# Patient Record
Sex: FEMALE | Race: WHITE | NOT HISPANIC OR LATINO
[De-identification: names, ages, dates, MRNs, and addresses within clinical notes are randomized per-mention and may not be internally consistent; named-entity substitution may affect disease eponyms.]

---

## 2017-11-02 ENCOUNTER — APPOINTMENT (OUTPATIENT)
Dept: BREAST CENTER | Facility: CLINIC | Age: 72
End: 2017-11-02
Payer: MEDICARE

## 2017-11-02 VITALS
DIASTOLIC BLOOD PRESSURE: 90 MMHG | HEIGHT: 62 IN | SYSTOLIC BLOOD PRESSURE: 138 MMHG | BODY MASS INDEX: 21.53 KG/M2 | WEIGHT: 117 LBS | HEART RATE: 88 BPM

## 2017-11-02 PROCEDURE — 99214 OFFICE O/P EST MOD 30 MIN: CPT

## 2019-01-07 ENCOUNTER — APPOINTMENT (OUTPATIENT)
Dept: BREAST CENTER | Facility: CLINIC | Age: 74
End: 2019-01-07
Payer: MEDICARE

## 2019-01-07 VITALS — WEIGHT: 116 LBS | HEIGHT: 62 IN | BODY MASS INDEX: 21.35 KG/M2

## 2019-01-07 VITALS — SYSTOLIC BLOOD PRESSURE: 145 MMHG | DIASTOLIC BLOOD PRESSURE: 84 MMHG | HEART RATE: 87 BPM

## 2019-01-07 PROCEDURE — 99214 OFFICE O/P EST MOD 30 MIN: CPT

## 2019-01-11 ENCOUNTER — OTHER (OUTPATIENT)
Age: 74
End: 2019-01-11

## 2019-05-06 ENCOUNTER — OUTPATIENT (OUTPATIENT)
Dept: OUTPATIENT SERVICES | Facility: HOSPITAL | Age: 74
LOS: 1 days | Discharge: HOME | End: 2019-05-06

## 2019-05-06 VITALS
OXYGEN SATURATION: 99 % | HEIGHT: 62 IN | HEART RATE: 78 BPM | DIASTOLIC BLOOD PRESSURE: 76 MMHG | RESPIRATION RATE: 16 BRPM | WEIGHT: 117.95 LBS | TEMPERATURE: 98 F | SYSTOLIC BLOOD PRESSURE: 188 MMHG

## 2019-05-06 VITALS
RESPIRATION RATE: 17 BRPM | HEART RATE: 55 BPM | TEMPERATURE: 98 F | SYSTOLIC BLOOD PRESSURE: 167 MMHG | OXYGEN SATURATION: 100 % | DIASTOLIC BLOOD PRESSURE: 75 MMHG

## 2019-05-06 DIAGNOSIS — Z98.890 OTHER SPECIFIED POSTPROCEDURAL STATES: Chronic | ICD-10-CM

## 2019-05-06 RX ORDER — OFLOXACIN 0.3 %
1 DROPS OPHTHALMIC (EYE)
Qty: 0 | Refills: 0 | Status: DISCONTINUED | OUTPATIENT
Start: 2019-05-06 | End: 2019-05-21

## 2019-05-06 RX ORDER — CYCLOPENTOLATE HYDROCHLORIDE 10 MG/ML
1 SOLUTION/ DROPS OPHTHALMIC
Qty: 0 | Refills: 0 | Status: COMPLETED | OUTPATIENT
Start: 2019-05-06 | End: 2019-05-06

## 2019-05-06 RX ORDER — PHENYLEPHRINE HCL 2.5 %
1 DROPS OPHTHALMIC (EYE)
Qty: 0 | Refills: 0 | Status: COMPLETED | OUTPATIENT
Start: 2019-05-06 | End: 2019-05-06

## 2019-05-06 RX ORDER — ACETAMINOPHEN 500 MG
650 TABLET ORAL EVERY 6 HOURS
Qty: 0 | Refills: 0 | Status: DISCONTINUED | OUTPATIENT
Start: 2019-05-06 | End: 2019-05-21

## 2019-05-06 RX ORDER — TROPICAMIDE 1 %
1 DROPS OPHTHALMIC (EYE)
Qty: 0 | Refills: 0 | Status: COMPLETED | OUTPATIENT
Start: 2019-05-06 | End: 2019-05-06

## 2019-05-06 RX ORDER — SODIUM CHLORIDE 9 MG/ML
1000 INJECTION, SOLUTION INTRAVENOUS
Qty: 0 | Refills: 0 | Status: DISCONTINUED | OUTPATIENT
Start: 2019-05-06 | End: 2019-05-06

## 2019-05-06 RX ADMIN — Medication 1 DROP(S): at 13:26

## 2019-05-06 RX ADMIN — Medication 1 DROP(S): at 13:07

## 2019-05-06 RX ADMIN — Medication 1 DROP(S): at 13:06

## 2019-05-06 RX ADMIN — CYCLOPENTOLATE HYDROCHLORIDE 1 DROP(S): 10 SOLUTION/ DROPS OPHTHALMIC at 13:25

## 2019-05-06 RX ADMIN — CYCLOPENTOLATE HYDROCHLORIDE 1 DROP(S): 10 SOLUTION/ DROPS OPHTHALMIC at 13:07

## 2019-05-06 RX ADMIN — Medication 1 DROP(S): at 13:15

## 2019-05-06 RX ADMIN — Medication 1 DROP(S): at 13:16

## 2019-05-06 RX ADMIN — CYCLOPENTOLATE HYDROCHLORIDE 1 DROP(S): 10 SOLUTION/ DROPS OPHTHALMIC at 13:15

## 2019-05-06 RX ADMIN — Medication 1 DROP(S): at 13:25

## 2019-05-06 NOTE — ASU PATIENT PROFILE, ADULT - PMH
Breast cancer    Hyperlipidemia, unspecified hyperlipidemia type    Hypertension, unspecified type    Hypothyroidism, unspecified type

## 2019-05-06 NOTE — ASU DISCHARGE PLAN (ADULT/PEDIATRIC) - NURSING INSTRUCTIONS
For any problems or concerns,contact your doctor. [facility] patients should call the [facility]. If you cannot reach the doctor or clinic, call [facility] Emergency Department at [phone] or go to your local Emergency Department.  A responsible adult should be with you for the rest of the day and night for your safety and to help you if you needed. Resume your medications as listed on the attached Medication Record. Begin with liquids and light food ( tea, toast, Jello, soups). Advance to what you normally eat. Liquids should taken in adequate amounts today.     CALL the DOCTOR:    -Fever greater than  101F  - Signs  of infection such as : increase pain,swelling,redness,or a bad  smell coming from the wound.  -Excessive amount of bleeding.  - Any pain that appears to be getting worse.  - Vomiting  -  If you have  not urinated 8 hours after surgery or have any difficulty urinating.     A responsible adult should be with you for the rest of the day and night for your safety and to help you if you needed.    Review attached FACT SHEET if applicable.

## 2019-05-09 DIAGNOSIS — H26.9 UNSPECIFIED CATARACT: ICD-10-CM

## 2019-05-09 DIAGNOSIS — H25.12 AGE-RELATED NUCLEAR CATARACT, LEFT EYE: ICD-10-CM

## 2019-05-09 DIAGNOSIS — Z88.8 ALLERGY STATUS TO OTHER DRUGS, MEDICAMENTS AND BIOLOGICAL SUBSTANCES STATUS: ICD-10-CM

## 2019-05-09 DIAGNOSIS — I10 ESSENTIAL (PRIMARY) HYPERTENSION: ICD-10-CM

## 2019-05-09 DIAGNOSIS — Z72.0 TOBACCO USE: ICD-10-CM

## 2019-11-11 PROBLEM — E78.5 HYPERLIPIDEMIA, UNSPECIFIED: Chronic | Status: ACTIVE | Noted: 2019-05-06

## 2019-11-11 PROBLEM — E03.9 HYPOTHYROIDISM, UNSPECIFIED: Chronic | Status: ACTIVE | Noted: 2019-05-06

## 2019-11-14 ENCOUNTER — APPOINTMENT (OUTPATIENT)
Dept: BREAST CENTER | Facility: CLINIC | Age: 74
End: 2019-11-14
Payer: MEDICARE

## 2019-11-14 VITALS
DIASTOLIC BLOOD PRESSURE: 89 MMHG | WEIGHT: 118 LBS | BODY MASS INDEX: 21.71 KG/M2 | HEART RATE: 88 BPM | HEIGHT: 62 IN | SYSTOLIC BLOOD PRESSURE: 173 MMHG

## 2019-11-14 DIAGNOSIS — N64.4 MASTODYNIA: ICD-10-CM

## 2019-11-14 DIAGNOSIS — Z85.3 PERSONAL HISTORY OF MALIGNANT NEOPLASM OF BREAST: ICD-10-CM

## 2019-11-14 PROCEDURE — 99214 OFFICE O/P EST MOD 30 MIN: CPT

## 2019-11-14 NOTE — HISTORY OF PRESENT ILLNESS
[FreeTextEntry1] : 74 year old female with a history of bilateral metachronous breast cancer presents for a surveillance examination.   She started having mild discomfort in her left breast near her lumpectomy scar last week.\par The symptoms are subsiding.

## 2019-11-14 NOTE — PROCEDURE
[FreeTextEntry3] : Targeted right breast ultrasound   Findings: 9 x 9 mm poorly marginated hypoechoic mass at 7:00 N6.

## 2019-11-14 NOTE — DATA REVIEWED
[No studies available for review at this time.] : No studies available for review at this time. [FreeTextEntry1] : Mammogram/sonogram:   1/11/19        Findings:  Negative\par

## 2019-11-14 NOTE — PHYSICAL EXAM
[Normocephalic] : normocephalic [Sclera nonicteric] : sclera nonicteric [Supple] : supple [Conjunctiva pink] : conjunctiva pink [No Supraclavicular Adenopathy] : no supraclavicular adenopathy [Clear to Auscultation Bilat] : clear to auscultation bilaterally [No Thyromegaly] : no thyromegaly [No Cervical Adenopathy] : no cervical adenopathy [No Gallops] : no gallops [Normal Sinus Rhythm] : normal sinus rhythm [No Rubs] : no pericardial rub [Symmetrical] : symmetrical [Examined in the supine and seated position] : examined in the supine and seated position [Grade 2] : Ptosis Grade 2 [No dominant masses] : no dominant masses in right breast  [No Nipple Retraction] : no right nipple retraction [No dominant masses] : no dominant masses left breast [No Nipple Discharge] : no left nipple discharge [No Axillary Lymphadenopathy] : no left axillary lymphadenopathy [Soft] : abdomen soft [No Palpable Masses] : no abdominal mass palpated [No Hepato-Splenomegaly] : no hepato-splenomegaly [No Edema] : no edema [No Rashes] : no rashes [Full ROM] : full range of motion [de-identified] : Lumpectomy scar at 12:00 N3 [de-identified] : Lumpectomy scar 3:00 periareolar region. Mild tenderness on palpation at the 3-4:00 periareolar region. No clinically palpable findings.

## 2020-07-29 ENCOUNTER — APPOINTMENT (OUTPATIENT)
Dept: BREAST CENTER | Facility: CLINIC | Age: 75
End: 2020-07-29
Payer: MEDICARE

## 2020-07-29 VITALS
WEIGHT: 112 LBS | HEART RATE: 89 BPM | HEIGHT: 62 IN | SYSTOLIC BLOOD PRESSURE: 182 MMHG | BODY MASS INDEX: 20.61 KG/M2 | DIASTOLIC BLOOD PRESSURE: 81 MMHG

## 2020-07-29 PROCEDURE — 19083 BX BREAST 1ST LESION US IMAG: CPT

## 2020-07-29 PROCEDURE — 99214 OFFICE O/P EST MOD 30 MIN: CPT

## 2020-08-04 ENCOUNTER — APPOINTMENT (OUTPATIENT)
Dept: BREAST CENTER | Facility: CLINIC | Age: 75
End: 2020-08-04
Payer: MEDICARE

## 2020-08-04 ENCOUNTER — LABORATORY RESULT (OUTPATIENT)
Age: 75
End: 2020-08-04

## 2020-08-04 VITALS
WEIGHT: 110 LBS | SYSTOLIC BLOOD PRESSURE: 174 MMHG | HEIGHT: 62 IN | DIASTOLIC BLOOD PRESSURE: 92 MMHG | HEART RATE: 84 BPM | BODY MASS INDEX: 20.24 KG/M2

## 2020-08-04 DIAGNOSIS — Z87.898 PERSONAL HISTORY OF OTHER SPECIFIED CONDITIONS: ICD-10-CM

## 2020-08-04 DIAGNOSIS — N63.10 UNSPECIFIED LUMP IN THE RIGHT BREAST, UNSPECIFIED QUADRANT: ICD-10-CM

## 2020-08-04 PROCEDURE — 19083 BX BREAST 1ST LESION US IMAG: CPT

## 2020-08-04 NOTE — REASON FOR VISIT
[Procedure: _________] : a [unfilled] procedure visit [FreeTextEntry1] : Ultrasound guided core biopsy right breast

## 2020-08-04 NOTE — PHYSICAL EXAM
[Conjunctiva pink] : conjunctiva pink [Normocephalic] : normocephalic [Sclera nonicteric] : sclera nonicteric [Supple] : supple [No Supraclavicular Adenopathy] : no supraclavicular adenopathy [No Cervical Adenopathy] : no cervical adenopathy [Clear to Auscultation Bilat] : clear to auscultation bilaterally [No Thyromegaly] : no thyromegaly [Normal Sinus Rhythm] : normal sinus rhythm [No Gallops] : no gallops [No Rubs] : no pericardial rub [Examined in the supine and seated position] : examined in the supine and seated position [Grade 2] : Ptosis Grade 2 [Symmetrical] : symmetrical [No dominant masses] : no dominant masses in right breast  [No dominant masses] : no dominant masses left breast [No Nipple Discharge] : no left nipple discharge [No Nipple Retraction] : no left nipple retraction [No Axillary Lymphadenopathy] : no left axillary lymphadenopathy [No Palpable Masses] : no abdominal mass palpated [Soft] : abdomen soft [No Hepato-Splenomegaly] : no hepato-splenomegaly [No Rashes] : no rashes [Full ROM] : full range of motion [No Edema] : no edema [de-identified] : Lumpectomy scar at 12:00 N3.  Prominent thickening below scar.   [de-identified] : Lumpectomy scar 3:00 periareolar region.

## 2020-08-04 NOTE — HISTORY OF PRESENT ILLNESS
[FreeTextEntry1] : 74 year old female with a history of bilateral metachronous breast cancer thinks that the scar tissue at her right lumpectomy site is more prominent.  She has no associated symptoms.

## 2020-08-04 NOTE — DATA REVIEWED
[No studies available for review at this time.] : No studies available for review at this time. [FreeTextEntry1] : Mammogram:   2/14/20     Findings:  Negative\par \par Breast Ultrasound:  2/14/20   Findings:  Stable 1 cm solid mass right breast at 7:00 N6. ( previously biopsied)\par

## 2020-08-07 PROBLEM — Z87.898 HISTORY OF LUMP OF RIGHT BREAST: Status: RESOLVED | Noted: 2020-07-29 | Resolved: 2020-08-07

## 2020-08-07 PROBLEM — N63.10 BREAST MASS, RIGHT: Status: ACTIVE | Noted: 2020-08-07

## 2020-08-11 ENCOUNTER — APPOINTMENT (OUTPATIENT)
Dept: BREAST CENTER | Facility: CLINIC | Age: 75
End: 2020-08-11
Payer: MEDICARE

## 2020-08-11 PROCEDURE — 99215 OFFICE O/P EST HI 40 MIN: CPT

## 2020-08-11 NOTE — CONSULT LETTER
[Please see my note below.] : Please see my note below. [Dear  ___] : Dear ~JCARLOS, [Courtesy Letter:] : I had the pleasure of seeing your patient, [unfilled], in my office today. [Sincerely,] : Sincerely, [FreeTextEntry2] : Damon Jerry MD [FreeTextEntry3] : Xiao Washington MD FACS\par

## 2020-08-11 NOTE — HISTORY OF PRESENT ILLNESS
[FreeTextEntry1] : 74 year old female presented 2 weeks ago with a palpable right breast mass at/near a prior lumpectomy site.  Core biopsy was performed in the office and the pathology revealed an ER +, HER2 negative invasive ductal carcinoma.  She is here for treatment planning.\par \par Breast cancer history:     2000 - Invasive Ductal Carcinoma LEFT breast.  ER positive.\par                                                     Lumpectomy/axillary node dissection.\par                                                     Adjuvant chemotherapy/Radiation \par                                                    Tamoxifen x 3 years.\par \par                                         2003-  Invasive Ductal Carcinoma RIGHT breast.  ER negative.\par                                                    Lumpectomy/sentinel node biopsy\par                                                    Chemotherapy/Radiation.\par

## 2020-08-11 NOTE — DATA REVIEWED
[FreeTextEntry1] : Bilateral Mammogram:  2/14/20    Findings: Extremely dense tissue.  No suspicious findings. Post surgically scar right breast at 12:00 N1 and left breast at 2:00 N1. Stable nodule right breast at 9:00 N6, previously benign on core biopsy. \par \par Right Diagnostic Mammogram: 7/31/20          Findings: Architectural distortion upper central right breast at site of palpable lump.\par \par Right Breast ultrasound:  18 x 18 mm ill defined, hypoechoic mass at 12:00 N1.  Not seen on prior ultrasound of 02/14/20.  \par \par IN office US core biopsy Right breast (12:00 N2-3) : 8/4/20  PATHOLOGY: Infiltrating Ductal Carcinoma ( 6/9).  ER - 50%,positive;  DE - 1%, negative. HER2 - negative.

## 2020-08-14 ENCOUNTER — OUTPATIENT (OUTPATIENT)
Dept: OUTPATIENT SERVICES | Facility: HOSPITAL | Age: 75
LOS: 1 days | End: 2020-08-14
Payer: MEDICARE

## 2020-08-14 ENCOUNTER — APPOINTMENT (OUTPATIENT)
Dept: NUCLEAR MEDICINE | Facility: CLINIC | Age: 75
End: 2020-08-14
Payer: MEDICARE

## 2020-08-14 DIAGNOSIS — C50.911 MALIGNANT NEOPLASM OF UNSPECIFIED SITE OF RIGHT FEMALE BREAST: ICD-10-CM

## 2020-08-14 DIAGNOSIS — Z98.890 OTHER SPECIFIED POSTPROCEDURAL STATES: Chronic | ICD-10-CM

## 2020-08-14 PROCEDURE — 78815 PET IMAGE W/CT SKULL-THIGH: CPT

## 2020-08-14 PROCEDURE — 78815 PET IMAGE W/CT SKULL-THIGH: CPT | Mod: 26,PI

## 2020-08-14 PROCEDURE — A9552: CPT

## 2020-08-19 ENCOUNTER — RESULT REVIEW (OUTPATIENT)
Age: 75
End: 2020-08-19

## 2020-08-19 ENCOUNTER — OUTPATIENT (OUTPATIENT)
Dept: OUTPATIENT SERVICES | Facility: HOSPITAL | Age: 75
LOS: 1 days | End: 2020-08-19
Payer: MEDICARE

## 2020-08-19 ENCOUNTER — APPOINTMENT (OUTPATIENT)
Dept: MRI IMAGING | Facility: CLINIC | Age: 75
End: 2020-08-19
Payer: MEDICARE

## 2020-08-19 DIAGNOSIS — C50.911 MALIGNANT NEOPLASM OF UNSPECIFIED SITE OF RIGHT FEMALE BREAST: ICD-10-CM

## 2020-08-19 DIAGNOSIS — Z98.890 OTHER SPECIFIED POSTPROCEDURAL STATES: Chronic | ICD-10-CM

## 2020-08-19 PROCEDURE — C8937: CPT

## 2020-08-19 PROCEDURE — C8908: CPT

## 2020-08-19 PROCEDURE — 77049 MRI BREAST C-+ W/CAD BI: CPT | Mod: 26

## 2020-08-19 PROCEDURE — A9585: CPT

## 2020-09-02 ENCOUNTER — APPOINTMENT (OUTPATIENT)
Dept: BREAST CENTER | Facility: CLINIC | Age: 75
End: 2020-09-02
Payer: MEDICARE

## 2020-09-02 PROCEDURE — 99215 OFFICE O/P EST HI 40 MIN: CPT

## 2020-09-02 NOTE — PHYSICAL EXAM
[Normocephalic] : normocephalic [Sclera nonicteric] : sclera nonicteric [Conjunctiva pink] : conjunctiva pink [Supple] : supple [No Supraclavicular Adenopathy] : no supraclavicular adenopathy [No Cervical Adenopathy] : no cervical adenopathy [No Thyromegaly] : no thyromegaly [Clear to Auscultation Bilat] : clear to auscultation bilaterally [Normal Sinus Rhythm] : normal sinus rhythm [No Gallops] : no gallops [No Rubs] : no pericardial rub [Examined in the supine and seated position] : examined in the supine and seated position [Grade 2] : Ptosis Grade 2 [Symmetrical] : symmetrical [No dominant masses] : no dominant masses left breast [No Nipple Retraction] : no left nipple retraction [No Nipple Discharge] : no left nipple discharge [No Axillary Lymphadenopathy] : no left axillary lymphadenopathy [Soft] : abdomen soft [No Palpable Masses] : no abdominal mass palpated [No Hepato-Splenomegaly] : no hepato-splenomegaly [No Edema] : no edema [Full ROM] : full range of motion [No Rashes] : no rashes [de-identified] : Lumpectomy scar at 12:00 N3.  Prominent thickening/mass at prior lumpectomy site.  Clinically measures > 2 cm. [de-identified] : Lumpectomy scar 3:00 periareolar region.

## 2020-09-02 NOTE — HISTORY OF PRESENT ILLNESS
[FreeTextEntry1] : 74 year old female recently diagnosed with an ER +, HER2 negative invasive ductal carcinoma. Her past history of breast cancer is as below:\par \par Breast cancer history:     2000 - Invasive Ductal Carcinoma LEFT breast.  ER positive.\par                                                     Lumpectomy/axillary node dissection.\par                                                     Adjuvant chemotherapy/Radiation \par                                                    Tamoxifen x 3 years.\par \par                                         2003-  Invasive Ductal Carcinoma RIGHT breast.  ER negative.\par                                                    Lumpectomy/sentinel node biopsy\par                                                    Chemotherapy/Radiation.\par \par Since her last visit she underwent a breast MRI, PET CT and plastic surgical consultation.\par She is here to discuss the testing results and to plan treatment.\par

## 2020-09-02 NOTE — DATA REVIEWED
[FreeTextEntry1] : Bilateral Mammogram:  2/14/20    Findings: Extremely dense tissue.  No suspicious findings. Post surgically scar right breast at 12:00 N1 and left breast at 2:00 N1. Stable nodule right breast at 9:00 N6, previously benign on core biopsy. \par \par Right Diagnostic Mammogram: 7/31/20          Findings: Architectural distortion upper central right breast at site of palpable lump.\par \par Right Breast ultrasound:  18 x 18 mm ill defined, hypoechoic mass at 12:00 N1.  Not seen on prior ultrasound of 02/14/20.  \par \par Breast MRI:  8/19/20   Findings:  3.5 cm mass c/w with palpable finding which is consistent with a known breast cancer. Linear nonmass enhancement extends anterior inferiorly to the nipple with enhancement. No axillary or internal mammary lymphadenopathy. \par \par IN office US core biopsy Right breast (12:00 N2-3) : 8/4/20  PATHOLOGY: Infiltrating Ductal Carcinoma ( 6/9).  ER - 50%,positive;  [place] - 1%, negative. HER2 - negative.\par \par Breast MRI:  8/19/20   Findings:  3.5 cm mass c/w with palpable finding which is consistent with a known breast cancer. Linear nonmass enhancement extends anterior inferiorly to the nipple with enhancement. No axillary or internal mammary lymphadenopathy. \par \par PET/CT: 8/14/20   Findings:    Mild FDG avid soft tissue mass medial right breast c/w known cancer. FDG disease extends into the overlying skin/areola.   Nonspecific 2 subcentimeter nodules right upper lung, too small to characterize.  Mild diffuse thyroid hypermetabolism.  \par

## 2020-09-21 ENCOUNTER — APPOINTMENT (OUTPATIENT)
Dept: BREAST CENTER | Facility: CLINIC | Age: 75
End: 2020-09-21
Payer: MEDICARE

## 2020-09-21 PROCEDURE — 99215 OFFICE O/P EST HI 40 MIN: CPT

## 2020-09-21 NOTE — DATA REVIEWED
[FreeTextEntry1] : Bilateral Mammogram:  2/14/20    Findings: Extremely dense tissue.  No suspicious findings. Post surgically scar right breast at 12:00 N1 and left breast at 2:00 N1. Stable nodule right breast at 9:00 N6, previously benign on core biopsy. \par \par Right Diagnostic Mammogram: 7/31/20          Findings: Architectural distortion upper central right breast at site of palpable lump.\par \par Right Breast ultrasound:  18 x 18 mm ill defined, hypoechoic mass at 12:00 N1.  Not seen on prior ultrasound of 02/14/20.  \par \par Breast MRI:  8/19/20   Findings:  3.5 cm mass c/w with palpable finding which is consistent with a known breast cancer. Linear nonmass enhancement extends anterior inferiorly to the nipple with enhancement. No axillary or internal mammary lymphadenopathy. \par \par IN office US core biopsy Right breast (12:00 N2-3) : 8/4/20  PATHOLOGY: Infiltrating Ductal Carcinoma ( 6/9).  ER - 50%,positive;  [place] - 1%, negative. HER2 - negative.\par \par Breast MRI:  8/19/20   Findings:  3.5 cm mass c/w with palpable finding which is consistent with a known breast cancer. Linear nonmass enhancement extends anterior inferiorly to the nipple with enhancement. No axillary or internal mammary lymphadenopathy. \par \par PET/CT: 8/14/20   Findings:    Mild FDG avid soft tissue mass medial right breast c/w known cancer. FDG disease extends into the overlying skin/areola.   Nonspecific 2 subcentimeter nodules right upper lung, too small to characterize.  Mild diffuse thyroid hypermetabolism.  \par

## 2020-09-21 NOTE — HISTORY OF PRESENT ILLNESS
[FreeTextEntry1] : 74 year old female recently diagnosed with an ER +, HER2 negative invasive ductal carcinoma.  Oncotype DX - 29.  Presents for review of her most recent diagnosis and her upcoming surgery.\par \par Her past history of breast cancer is as below:\par \par Breast cancer history:     2000 - Invasive Ductal Carcinoma LEFT breast.  ER positive.\par                                                     Lumpectomy/axillary node dissection.\par                                                     Adjuvant chemotherapy/Radiation \par                                                    Tamoxifen x 3 years.\par \par                                         2003-  Invasive Ductal Carcinoma RIGHT breast.  ER negative.\par                                                    Lumpectomy/sentinel node biopsy\par                                                    Chemotherapy/Radiation.\par \par Since her last visit she underwent a breast MRI, PET CT and plastic surgical consultation.\par She is here to discuss the testing results and to plan treatment.\par

## 2020-09-25 ENCOUNTER — APPOINTMENT (OUTPATIENT)
Dept: INTERNAL MEDICINE | Facility: CLINIC | Age: 75
End: 2020-09-25
Payer: MEDICARE

## 2020-09-25 VITALS
TEMPERATURE: 97.7 F | HEART RATE: 96 BPM | OXYGEN SATURATION: 96 % | BODY MASS INDEX: 19.51 KG/M2 | WEIGHT: 106 LBS | HEIGHT: 62 IN | DIASTOLIC BLOOD PRESSURE: 90 MMHG | SYSTOLIC BLOOD PRESSURE: 160 MMHG | RESPIRATION RATE: 16 BRPM

## 2020-09-25 VITALS — SYSTOLIC BLOOD PRESSURE: 140 MMHG | DIASTOLIC BLOOD PRESSURE: 82 MMHG

## 2020-09-25 DIAGNOSIS — Z83.49 FAMILY HISTORY OF OTHER ENDOCRINE, NUTRITIONAL AND METABOLIC DISEASES: ICD-10-CM

## 2020-09-25 DIAGNOSIS — Z83.3 FAMILY HISTORY OF DIABETES MELLITUS: ICD-10-CM

## 2020-09-25 PROCEDURE — 99204 OFFICE O/P NEW MOD 45 MIN: CPT

## 2020-09-25 RX ORDER — SERTRALINE 25 MG/1
25 TABLET, FILM COATED ORAL
Qty: 30 | Refills: 0 | Status: DISCONTINUED | COMMUNITY
Start: 2020-08-03 | End: 2020-09-25

## 2020-09-25 NOTE — HISTORY OF PRESENT ILLNESS
[FreeTextEntry1] : Patient comes in to establish care.\par  [de-identified] : Patient is a 74-year-old female history of hyperlipidemia, anxiety disorder, hypothyroidism, left breast CA status post lumpectomy and chemotherapy with radiation, right breast cancer status post lumpectomy, chemotherapy and radiation comes in to establish care. Patient recently diagnosed with new right breast invasive ductal carcinoma. Patient is scheduled for right mastectomy on October 7 with presurgical testing on October 1. She does follow regularly with oncologist .\par Her primary care physician Dr. Ray passed away of coronavirus.\par Patient denies any cp, sob,abdominal pain, nausea, vomiting, palpitations, fever, chills, constipation, diarrhea.\par

## 2020-09-25 NOTE — HEALTH RISK ASSESSMENT
[No] : In the past 12 months have you used drugs other than those required for medical reasons? No [0] : 2) Feeling down, depressed, or hopeless: Not at all (0) [Retired] : retired [Smoke Detector] : smoke detector [Carbon Monoxide Detector] : carbon monoxide detector [Seat Belt] :  uses seat belt [] : No [de-identified] : former smoker  [YearQuit] : 1975 [MYV4Gawwy] : 0 [EyeExamDate] : 2/20 [Guns at Home] : no guns at home [Sunscreen] : does not use sunscreen [MammogramDate] : 2/20 [PapSmearDate] : 01/18

## 2020-09-25 NOTE — ASSESSMENT
[FreeTextEntry1] : 1.infiltrating ductal carcinoma of the right breast: Patient is scheduled for right skin sparing mastectomy, sentinel biopsy and possible axillary node dissection on October 7 with . She will have presurgical test on October 1 and followup with us the same day for medical clearance. She will follow up with oncology after her surgery.\par \par 2.hyperlipidemia: obtain labs her previous PCP, continue on atorvastatin 10 mg daily.\par \par 3.hypothyroidism: Obtain records from previous PCP, continue on Synthroid 50 mcg daily.\par \par 4.anxiety disorder: Doing much better and sertraline 50 mg once daily.

## 2020-10-01 ENCOUNTER — OUTPATIENT (OUTPATIENT)
Dept: OUTPATIENT SERVICES | Facility: HOSPITAL | Age: 75
LOS: 1 days | End: 2020-10-01
Payer: MEDICARE

## 2020-10-01 ENCOUNTER — RESULT REVIEW (OUTPATIENT)
Age: 75
End: 2020-10-01

## 2020-10-01 ENCOUNTER — APPOINTMENT (OUTPATIENT)
Dept: INTERNAL MEDICINE | Facility: CLINIC | Age: 75
End: 2020-10-01
Payer: MEDICARE

## 2020-10-01 VITALS
HEIGHT: 62 IN | DIASTOLIC BLOOD PRESSURE: 72 MMHG | RESPIRATION RATE: 18 BRPM | SYSTOLIC BLOOD PRESSURE: 160 MMHG | WEIGHT: 106 LBS | BODY MASS INDEX: 19.51 KG/M2 | OXYGEN SATURATION: 96 % | TEMPERATURE: 98.6 F | HEART RATE: 110 BPM

## 2020-10-01 VITALS — DIASTOLIC BLOOD PRESSURE: 68 MMHG | SYSTOLIC BLOOD PRESSURE: 142 MMHG

## 2020-10-01 DIAGNOSIS — Z98.890 OTHER SPECIFIED POSTPROCEDURAL STATES: Chronic | ICD-10-CM

## 2020-10-01 DIAGNOSIS — Z98.42 CATARACT EXTRACTION STATUS, LEFT EYE: Chronic | ICD-10-CM

## 2020-10-01 DIAGNOSIS — D05.11 INTRADUCTAL CARCINOMA IN SITU OF RIGHT BREAST: ICD-10-CM

## 2020-10-01 DIAGNOSIS — Z01.818 ENCOUNTER FOR OTHER PREPROCEDURAL EXAMINATION: ICD-10-CM

## 2020-10-01 LAB
ANION GAP SERPL CALC-SCNC: 5 MMOL/L — SIGNIFICANT CHANGE UP (ref 5–17)
APPEARANCE UR: CLEAR — SIGNIFICANT CHANGE UP
BASOPHILS # BLD AUTO: 0.03 K/UL — SIGNIFICANT CHANGE UP (ref 0–0.2)
BASOPHILS NFR BLD AUTO: 0.4 % — SIGNIFICANT CHANGE UP (ref 0–2)
BILIRUB UR-MCNC: NEGATIVE — SIGNIFICANT CHANGE UP
BUN SERPL-MCNC: 19 MG/DL — SIGNIFICANT CHANGE UP (ref 7–23)
CALCIUM SERPL-MCNC: 9.5 MG/DL — SIGNIFICANT CHANGE UP (ref 8.5–10.1)
CHLORIDE SERPL-SCNC: 110 MMOL/L — HIGH (ref 96–108)
CO2 SERPL-SCNC: 27 MMOL/L — SIGNIFICANT CHANGE UP (ref 22–31)
COLOR SPEC: YELLOW — SIGNIFICANT CHANGE UP
CREAT SERPL-MCNC: 0.79 MG/DL — SIGNIFICANT CHANGE UP (ref 0.5–1.3)
DIFF PNL FLD: ABNORMAL
EOSINOPHIL # BLD AUTO: 0.07 K/UL — SIGNIFICANT CHANGE UP (ref 0–0.5)
EOSINOPHIL NFR BLD AUTO: 0.8 % — SIGNIFICANT CHANGE UP (ref 0–6)
GLUCOSE SERPL-MCNC: 99 MG/DL — SIGNIFICANT CHANGE UP (ref 70–99)
GLUCOSE UR QL: NEGATIVE MG/DL — SIGNIFICANT CHANGE UP
HCT VFR BLD CALC: 46.8 % — HIGH (ref 34.5–45)
HGB BLD-MCNC: 15.2 G/DL — SIGNIFICANT CHANGE UP (ref 11.5–15.5)
IMM GRANULOCYTES NFR BLD AUTO: 0.2 % — SIGNIFICANT CHANGE UP (ref 0–1.5)
INR BLD: 1.08 RATIO — SIGNIFICANT CHANGE UP (ref 0.88–1.16)
KETONES UR-MCNC: NEGATIVE — SIGNIFICANT CHANGE UP
LEUKOCYTE ESTERASE UR-ACNC: NEGATIVE — SIGNIFICANT CHANGE UP
LYMPHOCYTES # BLD AUTO: 1.87 K/UL — SIGNIFICANT CHANGE UP (ref 1–3.3)
LYMPHOCYTES # BLD AUTO: 22.2 % — SIGNIFICANT CHANGE UP (ref 13–44)
MCHC RBC-ENTMCNC: 31.1 PG — SIGNIFICANT CHANGE UP (ref 27–34)
MCHC RBC-ENTMCNC: 32.5 GM/DL — SIGNIFICANT CHANGE UP (ref 32–36)
MCV RBC AUTO: 95.9 FL — SIGNIFICANT CHANGE UP (ref 80–100)
MONOCYTES # BLD AUTO: 0.59 K/UL — SIGNIFICANT CHANGE UP (ref 0–0.9)
MONOCYTES NFR BLD AUTO: 7 % — SIGNIFICANT CHANGE UP (ref 2–14)
NEUTROPHILS # BLD AUTO: 5.86 K/UL — SIGNIFICANT CHANGE UP (ref 1.8–7.4)
NEUTROPHILS NFR BLD AUTO: 69.4 % — SIGNIFICANT CHANGE UP (ref 43–77)
NITRITE UR-MCNC: NEGATIVE — SIGNIFICANT CHANGE UP
PH UR: 7 — SIGNIFICANT CHANGE UP (ref 5–8)
PLATELET # BLD AUTO: 297 K/UL — SIGNIFICANT CHANGE UP (ref 150–400)
POTASSIUM SERPL-MCNC: 4 MMOL/L — SIGNIFICANT CHANGE UP (ref 3.5–5.3)
POTASSIUM SERPL-SCNC: 4 MMOL/L — SIGNIFICANT CHANGE UP (ref 3.5–5.3)
PROT UR-MCNC: 15 MG/DL
PROTHROM AB SERPL-ACNC: 12.5 SEC — SIGNIFICANT CHANGE UP (ref 10.6–13.6)
RBC # BLD: 4.88 M/UL — SIGNIFICANT CHANGE UP (ref 3.8–5.2)
RBC # FLD: 13 % — SIGNIFICANT CHANGE UP (ref 10.3–14.5)
SODIUM SERPL-SCNC: 142 MMOL/L — SIGNIFICANT CHANGE UP (ref 135–145)
SP GR SPEC: 1.01 — SIGNIFICANT CHANGE UP (ref 1.01–1.02)
UROBILINOGEN FLD QL: NEGATIVE MG/DL — SIGNIFICANT CHANGE UP
WBC # BLD: 8.44 K/UL — SIGNIFICANT CHANGE UP (ref 3.8–10.5)
WBC # FLD AUTO: 8.44 K/UL — SIGNIFICANT CHANGE UP (ref 3.8–10.5)

## 2020-10-01 PROCEDURE — 86850 RBC ANTIBODY SCREEN: CPT

## 2020-10-01 PROCEDURE — 71046 X-RAY EXAM CHEST 2 VIEWS: CPT | Mod: 26

## 2020-10-01 PROCEDURE — 93010 ELECTROCARDIOGRAM REPORT: CPT

## 2020-10-01 PROCEDURE — 80048 BASIC METABOLIC PNL TOTAL CA: CPT

## 2020-10-01 PROCEDURE — 71046 X-RAY EXAM CHEST 2 VIEWS: CPT

## 2020-10-01 PROCEDURE — 81001 URINALYSIS AUTO W/SCOPE: CPT

## 2020-10-01 PROCEDURE — 99214 OFFICE O/P EST MOD 30 MIN: CPT

## 2020-10-01 PROCEDURE — 36415 COLL VENOUS BLD VENIPUNCTURE: CPT

## 2020-10-01 PROCEDURE — 86900 BLOOD TYPING SEROLOGIC ABO: CPT

## 2020-10-01 PROCEDURE — 85025 COMPLETE CBC W/AUTO DIFF WBC: CPT

## 2020-10-01 PROCEDURE — 93005 ELECTROCARDIOGRAM TRACING: CPT

## 2020-10-01 PROCEDURE — 85610 PROTHROMBIN TIME: CPT

## 2020-10-01 PROCEDURE — 86901 BLOOD TYPING SEROLOGIC RH(D): CPT

## 2020-10-01 RX ORDER — CETIRIZINE HYDROCHLORIDE, PSEUDOEPHEDRINE HYDROCHLORIDE 5; 120 MG/1; MG/1
1 TABLET, FILM COATED, EXTENDED RELEASE ORAL
Qty: 0 | Refills: 0 | DISCHARGE

## 2020-10-01 RX ORDER — NEBIVOLOL HYDROCHLORIDE 5 MG/1
1 TABLET ORAL
Qty: 0 | Refills: 0 | DISCHARGE

## 2020-10-01 NOTE — CHART NOTE - NSCHARTNOTEFT_GEN_A_CORE
Vital Signs: Height 5' 2", Weight 105 pounds (47.7 Kg), B/P 176/96, HR 83, Resp 18, Temp 98.5 F, O2 Sat 99% on room air    Patient instructed on     1. NPO post midnight of surgery  2. On the use of EZ sponges  3. Mupirocin use  4. Aware that she needs medical clearance (has an appointment with Dr. Vasquez today)  5. May take Synthroid and Sertraline with a sip of water on morning of procedure Vital Signs: Height 5' 2", Weight 105 pounds (47.7 Kg), B/P 142/74, HR 83, Resp 18, Temp 98.5 F, O2 Sat 99% on room air    Patient instructed on     1. NPO post midnight of surgery  2. On the use of EZ sponges  3. Mupirocin use  4. Aware that she needs medical clearance (has an appointment with Dr. Vasquez today)  5. May take Synthroid and Sertraline with a sip of water on morning of procedure

## 2020-10-02 DIAGNOSIS — D05.11 INTRADUCTAL CARCINOMA IN SITU OF RIGHT BREAST: ICD-10-CM

## 2020-10-02 DIAGNOSIS — Z01.818 ENCOUNTER FOR OTHER PREPROCEDURAL EXAMINATION: ICD-10-CM

## 2020-10-02 NOTE — HISTORY OF PRESENT ILLNESS
[No Adverse Anesthesia Reaction] : no adverse anesthesia reaction in self or family member [(Patient denies any chest pain, claudication, dyspnea on exertion, orthopnea, palpitations or syncope)] : Patient denies any chest pain, claudication, dyspnea on exertion, orthopnea, palpitations or syncope [FreeTextEntry1] : right skin sparing mastectomy, sentinel biopsy and possible axillary node dissection [FreeTextEntry2] : 10/7/20 [FreeTextEntry3] :  [FreeTextEntry4] : ALFREDITO BRAGA is a 74 year F with hx of HLD, left breast ca s/p lumpectomy and chemotherapy with radiation, right breast cancer s/p lumpectomy, chemotherapy and radiation who comes in for a preoperative visit. She was recently diagnosed with new right breast invasive ductal carcinoma and is scheduled for right mastectomy on 10/7/20.\par Patient denies any cp, sob,abdominal pain, nausea, vomiting, palpitations, fever, chills, constipation, diarrhea.\par Anesthesia History: Ms. ALFREDITO BRAGA has had no adverse effects to anesthesia in the past. \par \par Functional Capacity-Walking 1-2 blocks or climbing stairs symptoms: Ms. ALFREDITO BRAGA denies any symptoms of chest pain, BAUTISTA, SOB or palpitations.\par

## 2020-10-02 NOTE — RESULTS/DATA
[] : results reviewed [de-identified] : wnl [de-identified] : wnl [de-identified] : wnl [de-identified] : negative [de-identified] : NSR at 68 bpm, no ST-T changes, No previous ECG to compare to.\par  [de-identified] : UA: trace blood, no nitrite/LE.

## 2020-10-02 NOTE — ASSESSMENT
[Patient Optimized for Surgery] : Patient optimized for surgery [As per surgery] : as per surgery [FreeTextEntry4] : Patient is moderate risk for intermediate risk procedure. \par \par Patient advised to hold aspirin, all NSAIDs including Motrin, naproxen, Aleve, ibuprofen, Advil and fish oil 7 days prior to surgery.\par Adequate oxygen monitoring. DVT prophylaxis.\par Continue current medications as directed.\par Covid 19 nasopharyngeal swab per Surgery.\par

## 2020-10-03 RX ORDER — MUPIROCIN 20 MG/G
1 OINTMENT TOPICAL
Qty: 0 | Refills: 0 | DISCHARGE
Start: 2020-10-03 | End: 2020-10-07

## 2020-10-04 ENCOUNTER — OUTPATIENT (OUTPATIENT)
Dept: OUTPATIENT SERVICES | Facility: HOSPITAL | Age: 75
LOS: 1 days | End: 2020-10-04
Payer: MEDICARE

## 2020-10-04 DIAGNOSIS — Z98.42 CATARACT EXTRACTION STATUS, LEFT EYE: Chronic | ICD-10-CM

## 2020-10-04 DIAGNOSIS — Z11.59 ENCOUNTER FOR SCREENING FOR OTHER VIRAL DISEASES: ICD-10-CM

## 2020-10-04 DIAGNOSIS — Z98.890 OTHER SPECIFIED POSTPROCEDURAL STATES: Chronic | ICD-10-CM

## 2020-10-04 PROCEDURE — U0003: CPT

## 2020-10-05 DIAGNOSIS — Z11.59 ENCOUNTER FOR SCREENING FOR OTHER VIRAL DISEASES: ICD-10-CM

## 2020-10-05 LAB — SARS-COV-2 RNA SPEC QL NAA+PROBE: SIGNIFICANT CHANGE UP

## 2020-10-05 NOTE — DATA REVIEWED
[FreeTextEntry1] : Bilateral Mammogram:  2/14/20    Findings: Extremely dense tissue.  No suspicious findings. Post surgically scar right breast at 12:00 N1 and left breast at 2:00 N1. Stable nodule right breast at 9:00 N6, previously benign on core biopsy. \par \par Right Diagnostic Mammogram: 7/31/20          Findings: Architectural distortion upper central right breast at site of palpable lump.\par \par Right Breast ultrasound:  18 x 18 mm ill defined, hypoechoic mass at 12:00 N1.  Not seen on prior ultrasound of 02/14/20.  \par \par Breast MRI:  8/19/20   Findings:  3.5 cm mass c/w with palpable finding which is consistent with a known breast cancer. Linear nonmass enhancement extends anterior inferiorly to the nipple with enhancement. No axillary or internal mammary lymphadenopathy. \par \par IN office US core biopsy Right breast (12:00 N2-3) : 8/4/20  PATHOLOGY: Infiltrating Ductal Carcinoma ( 6/9).  ER - 50%,positive;  [place] - 1%, negative. HER2 - negative.\par \par Breast MRI:  8/19/20   Findings:  3.5 cm mass c/w with palpable finding which is consistent with a known breast cancer. Linear nonmass enhancement extends anterior inferiorly to the nipple with enhancement. No axillary or internal mammary lymphadenopathy. \par \par PET/CT: 8/14/20   Findings:    Mild FDG avid soft tissue mass medial right breast c/w known cancer. FDG disease extends into the overlying skin/areola.   Nonspecific 2 subcentimeter nodules right upper lung, too small to characterize.  Mild diffuse thyroid hypermetabolism.  \par

## 2020-10-05 NOTE — PHYSICAL EXAM
[Normocephalic] : normocephalic [Sclera nonicteric] : sclera nonicteric [Conjunctiva pink] : conjunctiva pink [Supple] : supple [No Supraclavicular Adenopathy] : no supraclavicular adenopathy [No Cervical Adenopathy] : no cervical adenopathy [No Thyromegaly] : no thyromegaly [Clear to Auscultation Bilat] : clear to auscultation bilaterally [Normal Sinus Rhythm] : normal sinus rhythm [No Gallops] : no gallops [No Rubs] : no pericardial rub [Examined in the supine and seated position] : examined in the supine and seated position [Symmetrical] : symmetrical [Grade 2] : Ptosis Grade 2 [No dominant masses] : no dominant masses left breast [No Nipple Retraction] : no left nipple retraction [No Nipple Discharge] : no left nipple discharge [No Axillary Lymphadenopathy] : no left axillary lymphadenopathy [Soft] : abdomen soft [No Palpable Masses] : no abdominal mass palpated [No Hepato-Splenomegaly] : no hepato-splenomegaly [No Edema] : no edema [Full ROM] : full range of motion [No Rashes] : no rashes [de-identified] : Lumpectomy scar at 12:00 N3.  Prominent thickening/mass at prior lumpectomy site.  Clinically measures > 2 cm. [de-identified] : Lumpectomy scar 3:00 periareolar region.

## 2020-10-05 NOTE — HISTORY OF PRESENT ILLNESS
[FreeTextEntry1] : 74 year old female recently diagnosed with an ER +, HER2 negative invasive ductal carcinoma of the right breast.  She is being admitted to [facility] for a right mastectomy, sentinel node biopsy, possible axillary dissection and tissue expander/implant reconstruction. ( Dr. Saul).\par \par Her past history of breast cancer is as below:\par \par Breast cancer history:     2000 - Invasive Ductal Carcinoma LEFT breast.  ER positive.\par                                                     Lumpectomy/axillary node dissection.\par                                                     Adjuvant chemotherapy/Radiation \par                                                    Tamoxifen x 3 years.\par \par                                         2003-  Invasive Ductal Carcinoma RIGHT breast.  ER negative.\par                                                    Lumpectomy/sentinel node biopsy\par                                                    Chemotherapy/Radiation.\par

## 2020-10-06 RX ORDER — SODIUM CHLORIDE 9 MG/ML
1000 INJECTION, SOLUTION INTRAVENOUS
Refills: 0 | Status: DISCONTINUED | OUTPATIENT
Start: 2020-10-07 | End: 2020-10-07

## 2020-10-06 RX ORDER — ONDANSETRON 8 MG/1
4 TABLET, FILM COATED ORAL ONCE
Refills: 0 | Status: DISCONTINUED | OUTPATIENT
Start: 2020-10-07 | End: 2020-10-07

## 2020-10-06 RX ORDER — OXYCODONE HYDROCHLORIDE 5 MG/1
5 TABLET ORAL ONCE
Refills: 0 | Status: DISCONTINUED | OUTPATIENT
Start: 2020-10-07 | End: 2020-10-07

## 2020-10-06 RX ORDER — FENTANYL CITRATE 50 UG/ML
50 INJECTION INTRAVENOUS
Refills: 0 | Status: DISCONTINUED | OUTPATIENT
Start: 2020-10-07 | End: 2020-10-07

## 2020-10-06 RX ORDER — SODIUM CHLORIDE 9 MG/ML
3 INJECTION INTRAMUSCULAR; INTRAVENOUS; SUBCUTANEOUS EVERY 8 HOURS
Refills: 0 | Status: DISCONTINUED | OUTPATIENT
Start: 2020-10-07 | End: 2020-10-08

## 2020-10-07 ENCOUNTER — APPOINTMENT (OUTPATIENT)
Dept: BREAST CENTER | Facility: HOSPITAL | Age: 75
End: 2020-10-07

## 2020-10-07 ENCOUNTER — OUTPATIENT (OUTPATIENT)
Dept: INPATIENT UNIT | Facility: HOSPITAL | Age: 75
LOS: 1 days | Discharge: HOME | End: 2020-10-07
Payer: MEDICARE

## 2020-10-07 ENCOUNTER — RESULT REVIEW (OUTPATIENT)
Age: 75
End: 2020-10-07

## 2020-10-07 VITALS
HEIGHT: 62 IN | DIASTOLIC BLOOD PRESSURE: 76 MMHG | RESPIRATION RATE: 16 BRPM | TEMPERATURE: 99 F | HEART RATE: 67 BPM | WEIGHT: 106.26 LBS | OXYGEN SATURATION: 99 % | SYSTOLIC BLOOD PRESSURE: 154 MMHG

## 2020-10-07 DIAGNOSIS — C50.911 MALIGNANT NEOPLASM OF UNSPECIFIED SITE OF RIGHT FEMALE BREAST: ICD-10-CM

## 2020-10-07 DIAGNOSIS — Z98.890 OTHER SPECIFIED POSTPROCEDURAL STATES: Chronic | ICD-10-CM

## 2020-10-07 DIAGNOSIS — N60.31 FIBROSCLEROSIS OF RIGHT BREAST: ICD-10-CM

## 2020-10-07 DIAGNOSIS — Z98.42 CATARACT EXTRACTION STATUS, LEFT EYE: Chronic | ICD-10-CM

## 2020-10-07 DIAGNOSIS — E03.9 HYPOTHYROIDISM, UNSPECIFIED: ICD-10-CM

## 2020-10-07 DIAGNOSIS — D05.11 INTRADUCTAL CARCINOMA IN SITU OF RIGHT BREAST: ICD-10-CM

## 2020-10-07 DIAGNOSIS — N60.21 FIBROADENOSIS OF RIGHT BREAST: ICD-10-CM

## 2020-10-07 DIAGNOSIS — E78.5 HYPERLIPIDEMIA, UNSPECIFIED: ICD-10-CM

## 2020-10-07 PROCEDURE — 88307 TISSUE EXAM BY PATHOLOGIST: CPT

## 2020-10-07 PROCEDURE — 88305 TISSUE EXAM BY PATHOLOGIST: CPT

## 2020-10-07 PROCEDURE — 88329 PATH CONSLTJ DRG SURG: CPT | Mod: 59

## 2020-10-07 PROCEDURE — A9520: CPT

## 2020-10-07 PROCEDURE — C1889: CPT

## 2020-10-07 PROCEDURE — 88329 PATH CONSLTJ DRG SURG: CPT | Mod: XU

## 2020-10-07 PROCEDURE — 19303 MAST SIMPLE COMPLETE: CPT | Mod: RT

## 2020-10-07 PROCEDURE — 99261: CPT

## 2020-10-07 PROCEDURE — C1789: CPT

## 2020-10-07 PROCEDURE — 88305 TISSUE EXAM BY PATHOLOGIST: CPT | Mod: 26

## 2020-10-07 PROCEDURE — 38900 IO MAP OF SENT LYMPH NODE: CPT | Mod: RT

## 2020-10-07 PROCEDURE — 38792 RA TRACER ID OF SENTINL NODE: CPT | Mod: RT,59

## 2020-10-07 PROCEDURE — 88307 TISSUE EXAM BY PATHOLOGIST: CPT | Mod: 26

## 2020-10-07 PROCEDURE — 19303 MAST SIMPLE COMPLETE: CPT | Mod: AS,RT

## 2020-10-07 PROCEDURE — 88333 PATH CONSLTJ SURG CYTO XM 1: CPT | Mod: 59

## 2020-10-07 PROCEDURE — 38525 BIOPSY/REMOVAL LYMPH NODES: CPT | Mod: RT

## 2020-10-07 PROCEDURE — 88333 PATH CONSLTJ SURG CYTO XM 1: CPT | Mod: 26,59

## 2020-10-07 RX ORDER — HEPARIN SODIUM 5000 [USP'U]/ML
5000 INJECTION INTRAVENOUS; SUBCUTANEOUS EVERY 12 HOURS
Refills: 0 | Status: DISCONTINUED | OUTPATIENT
Start: 2020-10-07 | End: 2020-10-08

## 2020-10-07 RX ORDER — SERTRALINE 25 MG/1
50 TABLET, FILM COATED ORAL DAILY
Refills: 0 | Status: DISCONTINUED | OUTPATIENT
Start: 2020-10-07 | End: 2020-10-08

## 2020-10-07 RX ORDER — METOCLOPRAMIDE HCL 10 MG
10 TABLET ORAL ONCE
Refills: 0 | Status: DISCONTINUED | OUTPATIENT
Start: 2020-10-07 | End: 2020-10-08

## 2020-10-07 RX ORDER — HYDROMORPHONE HYDROCHLORIDE 2 MG/ML
0.5 INJECTION INTRAMUSCULAR; INTRAVENOUS; SUBCUTANEOUS EVERY 4 HOURS
Refills: 0 | Status: DISCONTINUED | OUTPATIENT
Start: 2020-10-07 | End: 2020-10-07

## 2020-10-07 RX ORDER — ATORVASTATIN CALCIUM 80 MG/1
10 TABLET, FILM COATED ORAL DAILY
Refills: 0 | Status: DISCONTINUED | OUTPATIENT
Start: 2020-10-07 | End: 2020-10-08

## 2020-10-07 RX ORDER — OXYCODONE HYDROCHLORIDE 5 MG/1
5 TABLET ORAL EVERY 4 HOURS
Refills: 0 | Status: DISCONTINUED | OUTPATIENT
Start: 2020-10-07 | End: 2020-10-08

## 2020-10-07 RX ORDER — ACETAMINOPHEN 500 MG
1000 TABLET ORAL EVERY 8 HOURS
Refills: 0 | Status: DISCONTINUED | OUTPATIENT
Start: 2020-10-07 | End: 2020-10-08

## 2020-10-07 RX ORDER — ACETAMINOPHEN 500 MG
975 TABLET ORAL ONCE
Refills: 0 | Status: COMPLETED | OUTPATIENT
Start: 2020-10-07 | End: 2020-10-07

## 2020-10-07 RX ORDER — INFLUENZA VIRUS VACCINE 15; 15; 15; 15 UG/.5ML; UG/.5ML; UG/.5ML; UG/.5ML
0.5 SUSPENSION INTRAMUSCULAR ONCE
Refills: 0 | Status: COMPLETED | OUTPATIENT
Start: 2020-10-07 | End: 2020-10-07

## 2020-10-07 RX ORDER — SODIUM CHLORIDE 9 MG/ML
1000 INJECTION, SOLUTION INTRAVENOUS
Refills: 0 | Status: DISCONTINUED | OUTPATIENT
Start: 2020-10-07 | End: 2020-10-08

## 2020-10-07 RX ORDER — HYDROMORPHONE HYDROCHLORIDE 2 MG/ML
0.5 INJECTION INTRAMUSCULAR; INTRAVENOUS; SUBCUTANEOUS EVERY 4 HOURS
Refills: 0 | Status: DISCONTINUED | OUTPATIENT
Start: 2020-10-07 | End: 2020-10-08

## 2020-10-07 RX ORDER — FAMOTIDINE 10 MG/ML
20 INJECTION INTRAVENOUS ONCE
Refills: 0 | Status: COMPLETED | OUTPATIENT
Start: 2020-10-07 | End: 2020-10-07

## 2020-10-07 RX ORDER — ONDANSETRON 8 MG/1
4 TABLET, FILM COATED ORAL EVERY 6 HOURS
Refills: 0 | Status: DISCONTINUED | OUTPATIENT
Start: 2020-10-07 | End: 2020-10-08

## 2020-10-07 RX ORDER — ACETAMINOPHEN 500 MG
975 TABLET ORAL EVERY 8 HOURS
Refills: 0 | Status: DISCONTINUED | OUTPATIENT
Start: 2020-10-08 | End: 2020-10-08

## 2020-10-07 RX ADMIN — Medication 975 MILLIGRAM(S): at 08:41

## 2020-10-07 RX ADMIN — Medication 975 MILLIGRAM(S): at 08:40

## 2020-10-07 RX ADMIN — ATORVASTATIN CALCIUM 10 MILLIGRAM(S): 80 TABLET, FILM COATED ORAL at 21:59

## 2020-10-07 RX ADMIN — HEPARIN SODIUM 5000 UNIT(S): 5000 INJECTION INTRAVENOUS; SUBCUTANEOUS at 21:58

## 2020-10-07 RX ADMIN — Medication 400 MILLIGRAM(S): at 21:59

## 2020-10-07 RX ADMIN — FAMOTIDINE 20 MILLIGRAM(S): 10 INJECTION INTRAVENOUS at 08:40

## 2020-10-07 RX ADMIN — OXYCODONE HYDROCHLORIDE 5 MILLIGRAM(S): 5 TABLET ORAL at 17:32

## 2020-10-07 NOTE — BRIEF OPERATIVE NOTE - NSICDXBRIEFPOSTOP_GEN_ALL_CORE_FT
POST-OP DIAGNOSIS:  Infiltrating ductal carcinoma of right breast 07-Oct-2020 13:22:32  Shabbir Pichardo

## 2020-10-07 NOTE — BRIEF OPERATIVE NOTE - OPERATION/FINDINGS
right breast 169 g  right axillary sentinel node and right axillary non-sentinel node negative for metastatic disease on intraoperative pathologic review

## 2020-10-07 NOTE — BRIEF OPERATIVE NOTE - NSICDXBRIEFPREOP_GEN_ALL_CORE_FT
PRE-OP DIAGNOSIS:  Infiltrating ductal carcinoma of right breast 07-Oct-2020 13:22:23  Shabbir Pichardo

## 2020-10-07 NOTE — BRIEF OPERATIVE NOTE - NSICDXBRIEFPROCEDURE_GEN_ALL_CORE_FT
PROCEDURES:  Biopsy of right axillary sentinel nodes 07-Oct-2020 13:24:24  Shabbir Pichardo  Simple mastectomy 07-Oct-2020 13:21:29 right Shabbir Pichardo   PROCEDURES:  Reconstruction, breast, with tissue expander 07-Oct-2020 14:44:00  David Wade  Biopsy of right axillary sentinel nodes 07-Oct-2020 13:24:24  Shabbir Pichardo  Simple mastectomy 07-Oct-2020 13:21:29 right Shabbir Pichardo

## 2020-10-08 ENCOUNTER — TRANSCRIPTION ENCOUNTER (OUTPATIENT)
Age: 75
End: 2020-10-08

## 2020-10-08 VITALS
HEART RATE: 71 BPM | DIASTOLIC BLOOD PRESSURE: 77 MMHG | SYSTOLIC BLOOD PRESSURE: 149 MMHG | OXYGEN SATURATION: 98 % | RESPIRATION RATE: 16 BRPM | TEMPERATURE: 98 F

## 2020-10-08 RX ADMIN — HEPARIN SODIUM 5000 UNIT(S): 5000 INJECTION INTRAVENOUS; SUBCUTANEOUS at 09:35

## 2020-10-08 RX ADMIN — SODIUM CHLORIDE 75 MILLILITER(S): 9 INJECTION, SOLUTION INTRAVENOUS at 05:15

## 2020-10-08 RX ADMIN — SERTRALINE 50 MILLIGRAM(S): 25 TABLET, FILM COATED ORAL at 09:35

## 2020-10-08 RX ADMIN — Medication 400 MILLIGRAM(S): at 05:15

## 2020-10-08 NOTE — PROGRESS NOTE ADULT - SUBJECTIVE AND OBJECTIVE BOX
SURGICAL PROGRESS NOTE    STATUS POST:  Right simple mastectomy, [place] node biopsy, tissue expander reconstruction    POST OPERATIVE DAY #: 1    Vital Signs Last 24 Hrs  T(C): 36.9 (08 Oct 2020 08:24), Max: 36.9 (07 Oct 2020 21:45)  T(F): 98.4 (08 Oct 2020 08:24), Max: 98.5 (07 Oct 2020 21:45)  HR: 71 (08 Oct 2020 08:24) (60 - 87)  BP: 149/77 (08 Oct 2020 08:24) (118/46 - 179/69)  BP(mean): --  RR: 16 (08 Oct 2020 08:24) (14 - 18)  SpO2: 98% (08 Oct 2020 08:24) (96% - 100%)      SUBJECTIVE:     I&O's Detail    07 Oct 2020 07:01  -  08 Oct 2020 07:00  --------------------------------------------------------  IN:    Other (mL): 1000 mL  Total IN: 1000 mL    OUT:    Drain (mL): 140 mL    Drain (mL): 65 mL    Voided (mL): 400 mL  Total OUT: 605 mL    Total NET: 395 mL          MEDICATIONS  (STANDING):  acetaminophen   Tablet .. 975 milliGRAM(s) Oral every 8 hours  acetaminophen  IVPB .. 1000 milliGRAM(s) IV Intermittent every 8 hours  atorvastatin Oral Tab/Cap - Peds 10 milliGRAM(s) Oral daily  heparin   Injectable 5000 Unit(s) SubCutaneous every 12 hours  influenza   Vaccine 0.5 milliLiter(s) IntraMuscular once  lactated ringers. 1000 milliLiter(s) (75 mL/Hr) IV Continuous <Continuous>  sertraline 50 milliGRAM(s) Oral daily  sodium chloride 0.9% lock flush 3 milliLiter(s) IV Push every 8 hours    MEDICATIONS  (PRN):  HYDROmorphone  Injectable 0.5 milliGRAM(s) IV Push every 4 hours PRN Severe Pain (7 - 10)  metoclopramide Injectable 10 milliGRAM(s) IV Push once PRN Nausea and/or Vomiting  ondansetron Injectable 4 milliGRAM(s) IV Push every 6 hours PRN Nausea  oxyCODONE    IR 5 milliGRAM(s) Oral every 4 hours PRN Moderate Pain (4 - 6)

## 2020-10-08 NOTE — DISCHARGE NOTE PROVIDER - PROVIDER TOKENS
PROVIDER:[TOKEN:[[id number]],FOLLOWUP:[2 weeks],ESTABLISHEDPATIENT:[T]],PROVIDER:[TOKEN:[[id number]],FOLLOWUP:[2 weeks],ESTABLISHEDPATIENT:[T]]

## 2020-10-08 NOTE — DISCHARGE NOTE PROVIDER - NSDCCPTREATMENT_GEN_ALL_CORE_FT
PRINCIPAL PROCEDURE  Procedure: Mastectomy of right breast with sentinel lymph node biopsy  Findings and Treatment:       SECONDARY PROCEDURE  Procedure: Insert tissue expander  Findings and Treatment:

## 2020-10-08 NOTE — DISCHARGE NOTE PROVIDER - HOSPITAL COURSE
Patient underwent a right simple mastectomy, sentinel node biopsy and tissue expander reconstruction.  No postoperative complications.  Minimal postop pain.

## 2020-10-08 NOTE — PROGRESS NOTE ADULT - ASSESSMENT
75 year old female with a history of bilateral metachrnous breast cancer with a newly diagnosed right breast cancer.  Patient underwent a right simple mastectomy/SNB and tissue expander reconstruction.   Her postop course is uneventful.  Patient has been OOB, tolerating a regular diet.  She is stable for discharge to home today.

## 2020-10-08 NOTE — DISCHARGE NOTE NURSING/CASE MANAGEMENT/SOCIAL WORK - PATIENT PORTAL LINK FT
You can access the FollowMyHealth Patient Portal offered by [facility] by registering at the following website: [email or web address]. By joining [facility]’s FollowMyHealth portal, you will also be able to view your health information using other applications (apps) compatible with our system.

## 2020-10-08 NOTE — DISCHARGE NOTE PROVIDER - NSDCMRMEDTOKEN_GEN_ALL_CORE_FT
atorvastatin 10 mg oral tablet: 1 tab(s) orally once a day  sertraline 50 mg oral tablet: 1 tab(s) orally once a day  Synthroid 50 mcg (0.05 mg) oral tablet: 1 tab(s) orally once a day  Vitamin D3 2000 intl units (50 mcg) oral tablet: 1 cap(s) orally once a day

## 2020-10-08 NOTE — DISCHARGE NOTE PROVIDER - CARE PROVIDER_API CALL
Xiao Washington  SURGERY  [address], [place] [address]  Phone: [phone]  Fax: [phone]  Established Patient  Follow Up Time: 2 weeks    Norman Saul  PLASTIC SURGERY  [address], [address]  [place] [address]  Phone: [phone]  Fax: [phone]  Established Patient  Follow Up Time: 2 weeks

## 2020-10-08 NOTE — DISCHARGE NOTE PROVIDER - NSDCCPCAREPLAN_GEN_ALL_CORE_FT
PRINCIPAL DISCHARGE DIAGNOSIS  Diagnosis: Cancer of midline of breast, right  Assessment and Plan of Treatment:

## 2020-10-14 DIAGNOSIS — D05.11 INTRADUCTAL CARCINOMA IN SITU OF RIGHT BREAST: ICD-10-CM

## 2020-10-19 ENCOUNTER — APPOINTMENT (OUTPATIENT)
Dept: BREAST CENTER | Facility: CLINIC | Age: 75
End: 2020-10-19
Payer: MEDICARE

## 2020-10-19 PROCEDURE — 99024 POSTOP FOLLOW-UP VISIT: CPT

## 2020-10-19 NOTE — CONSULT LETTER
[Dear  ___] : Dear ~JCARLOS, [Courtesy Letter:] : I had the pleasure of seeing your patient, [unfilled], in my office today. [Please see my note below.] : Please see my note below. [Sincerely,] : Sincerely, [FreeTextEntry3] : Xiao Washington MD FACS\par  [DrDeborah  ___] : Dr. MICHELLE [FreeTextEntry2] : Clemencia Vasquez MD [DrDeborah ___] : Dr. MICHELLE

## 2020-10-19 NOTE — DATA REVIEWED
[FreeTextEntry1] : SURGICAL PATHOLOGY: 10/7/20    RESULTS:  [place] node # 1 - negative for metastases.  NOnsentinel node # 1 - negative for metastases.   Right mastectomy - 2.5 cm Infiltrating ductal carcinoma, moderately differentiated.( 6-7/9).  Margins of resection are negative.    ER -50%, positive;  FL - < 1%, negative.  HER2 - negative.

## 2020-10-19 NOTE — HISTORY OF PRESENT ILLNESS
[FreeTextEntry1] : 74 year old female recently diagnosed with an ER +, HER2 negative invasive ductal carcinoma.  Oncotype DX - 29.   The patient underwent a right skin sparing mastectomy, sentinel node biopsy and tissue expander reconstruction on 10/7/20 . She presents for a postop visit.\par \par Cancer History:\par \par Breast cancer history:     2000 - Invasive Ductal Carcinoma LEFT breast.  ER positive.\par                                                     Lumpectomy/axillary node dissection.\par                                                     Adjuvant chemotherapy/Radiation \par                                                    Tamoxifen x 3 years.\par \par                                         2003-  Invasive Ductal Carcinoma RIGHT breast.  ER negative.\par                                                    Lumpectomy/sentinel node biopsy\par                                                    Chemotherapy/Radiation.\par \par

## 2020-10-19 NOTE — PHYSICAL EXAM
[No Edema] : no edema [de-identified] : Vertical mastectomy incision healing well.  No erythema or drainage.  Tissue expander in place.  LUIS drains x 2 in place with serosanguinous fluid

## 2020-12-14 PROBLEM — I10 ESSENTIAL (PRIMARY) HYPERTENSION: Chronic | Status: ACTIVE | Noted: 2019-05-06

## 2020-12-14 PROBLEM — F41.9 ANXIETY DISORDER, UNSPECIFIED: Chronic | Status: ACTIVE | Noted: 2020-10-01

## 2020-12-18 ENCOUNTER — APPOINTMENT (OUTPATIENT)
Dept: INTERNAL MEDICINE | Facility: CLINIC | Age: 75
End: 2020-12-18

## 2020-12-30 ENCOUNTER — APPOINTMENT (OUTPATIENT)
Dept: INTERNAL MEDICINE | Facility: CLINIC | Age: 75
End: 2020-12-30
Payer: MEDICARE

## 2020-12-30 VITALS
HEIGHT: 62 IN | WEIGHT: 108 LBS | BODY MASS INDEX: 19.88 KG/M2 | HEART RATE: 94 BPM | OXYGEN SATURATION: 98 % | DIASTOLIC BLOOD PRESSURE: 62 MMHG | SYSTOLIC BLOOD PRESSURE: 148 MMHG | TEMPERATURE: 96.7 F

## 2020-12-30 DIAGNOSIS — Z23 ENCOUNTER FOR IMMUNIZATION: ICD-10-CM

## 2020-12-30 PROCEDURE — 99214 OFFICE O/P EST MOD 30 MIN: CPT | Mod: 25

## 2020-12-30 PROCEDURE — G0009: CPT

## 2020-12-30 PROCEDURE — 90732 PPSV23 VACC 2 YRS+ SUBQ/IM: CPT

## 2020-12-30 RX ORDER — CEFADROXIL 500 MG/1
500 CAPSULE ORAL
Qty: 14 | Refills: 0 | Status: DISCONTINUED | COMMUNITY
Start: 2020-09-29 | End: 2020-12-30

## 2020-12-30 RX ORDER — OXYCODONE 5 MG/1
5 TABLET ORAL
Qty: 30 | Refills: 0 | Status: DISCONTINUED | COMMUNITY
Start: 2020-09-29 | End: 2020-12-30

## 2021-01-04 ENCOUNTER — APPOINTMENT (OUTPATIENT)
Dept: INTERNAL MEDICINE | Facility: CLINIC | Age: 76
End: 2021-01-04
Payer: MEDICARE

## 2021-01-04 PROCEDURE — G0008: CPT

## 2021-01-04 PROCEDURE — 90662 IIV NO PRSV INCREASED AG IM: CPT

## 2021-01-07 ENCOUNTER — OUTPATIENT (OUTPATIENT)
Dept: OUTPATIENT SERVICES | Facility: HOSPITAL | Age: 76
LOS: 1 days | End: 2021-01-07
Payer: MEDICARE

## 2021-01-07 VITALS
HEIGHT: 60 IN | WEIGHT: 105.82 LBS | DIASTOLIC BLOOD PRESSURE: 80 MMHG | OXYGEN SATURATION: 98 % | TEMPERATURE: 98 F | RESPIRATION RATE: 18 BRPM | HEART RATE: 68 BPM | SYSTOLIC BLOOD PRESSURE: 167 MMHG

## 2021-01-07 DIAGNOSIS — Z98.42 CATARACT EXTRACTION STATUS, LEFT EYE: Chronic | ICD-10-CM

## 2021-01-07 DIAGNOSIS — Z85.3 PERSONAL HISTORY OF MALIGNANT NEOPLASM OF BREAST: ICD-10-CM

## 2021-01-07 DIAGNOSIS — Z90.13 ACQUIRED ABSENCE OF BILATERAL BREASTS AND NIPPLES: Chronic | ICD-10-CM

## 2021-01-07 DIAGNOSIS — Z98.890 OTHER SPECIFIED POSTPROCEDURAL STATES: Chronic | ICD-10-CM

## 2021-01-07 DIAGNOSIS — Z90.11 ACQUIRED ABSENCE OF RIGHT BREAST AND NIPPLE: ICD-10-CM

## 2021-01-07 LAB
ANION GAP SERPL CALC-SCNC: 6 MMOL/L — SIGNIFICANT CHANGE UP (ref 5–17)
APPEARANCE UR: CLEAR — SIGNIFICANT CHANGE UP
APTT BLD: 33.3 SEC — SIGNIFICANT CHANGE UP (ref 27.5–35.5)
BASOPHILS # BLD AUTO: 0 K/UL — SIGNIFICANT CHANGE UP (ref 0–0.2)
BASOPHILS NFR BLD AUTO: 0 % — SIGNIFICANT CHANGE UP (ref 0–2)
BILIRUB UR-MCNC: NEGATIVE — SIGNIFICANT CHANGE UP
BUN SERPL-MCNC: 20 MG/DL — SIGNIFICANT CHANGE UP (ref 7–23)
CALCIUM SERPL-MCNC: 9.7 MG/DL — SIGNIFICANT CHANGE UP (ref 8.5–10.1)
CHLORIDE SERPL-SCNC: 106 MMOL/L — SIGNIFICANT CHANGE UP (ref 96–108)
CO2 SERPL-SCNC: 28 MMOL/L — SIGNIFICANT CHANGE UP (ref 22–31)
COLOR SPEC: YELLOW — SIGNIFICANT CHANGE UP
CREAT SERPL-MCNC: 0.89 MG/DL — SIGNIFICANT CHANGE UP (ref 0.5–1.3)
DIFF PNL FLD: ABNORMAL
EOSINOPHIL # BLD AUTO: 0.16 K/UL — SIGNIFICANT CHANGE UP (ref 0–0.5)
EOSINOPHIL NFR BLD AUTO: 2 % — SIGNIFICANT CHANGE UP (ref 0–6)
GLUCOSE SERPL-MCNC: 91 MG/DL — SIGNIFICANT CHANGE UP (ref 70–99)
GLUCOSE UR QL: NEGATIVE MG/DL — SIGNIFICANT CHANGE UP
HCT VFR BLD CALC: 47.8 % — HIGH (ref 34.5–45)
HGB BLD-MCNC: 15.5 G/DL — SIGNIFICANT CHANGE UP (ref 11.5–15.5)
INR BLD: 1.06 RATIO — SIGNIFICANT CHANGE UP (ref 0.88–1.16)
KETONES UR-MCNC: NEGATIVE — SIGNIFICANT CHANGE UP
LEUKOCYTE ESTERASE UR-ACNC: NEGATIVE — SIGNIFICANT CHANGE UP
LYMPHOCYTES # BLD AUTO: 2.34 K/UL — SIGNIFICANT CHANGE UP (ref 1–3.3)
LYMPHOCYTES # BLD AUTO: 29 % — SIGNIFICANT CHANGE UP (ref 13–44)
MCHC RBC-ENTMCNC: 31.3 PG — SIGNIFICANT CHANGE UP (ref 27–34)
MCHC RBC-ENTMCNC: 32.4 GM/DL — SIGNIFICANT CHANGE UP (ref 32–36)
MCV RBC AUTO: 96.4 FL — SIGNIFICANT CHANGE UP (ref 80–100)
MONOCYTES # BLD AUTO: 0.56 K/UL — SIGNIFICANT CHANGE UP (ref 0–0.9)
MONOCYTES NFR BLD AUTO: 7 % — SIGNIFICANT CHANGE UP (ref 2–14)
NEUTROPHILS # BLD AUTO: 5 K/UL — SIGNIFICANT CHANGE UP (ref 1.8–7.4)
NEUTROPHILS NFR BLD AUTO: 62 % — SIGNIFICANT CHANGE UP (ref 43–77)
NITRITE UR-MCNC: NEGATIVE — SIGNIFICANT CHANGE UP
NRBC # BLD: SIGNIFICANT CHANGE UP /100 WBCS (ref 0–0)
PH UR: 5 — SIGNIFICANT CHANGE UP (ref 5–8)
PLATELET # BLD AUTO: 311 K/UL — SIGNIFICANT CHANGE UP (ref 150–400)
POTASSIUM SERPL-MCNC: 3.9 MMOL/L — SIGNIFICANT CHANGE UP (ref 3.5–5.3)
POTASSIUM SERPL-SCNC: 3.9 MMOL/L — SIGNIFICANT CHANGE UP (ref 3.5–5.3)
PROT UR-MCNC: NEGATIVE MG/DL — SIGNIFICANT CHANGE UP
PROTHROM AB SERPL-ACNC: 12.3 SEC — SIGNIFICANT CHANGE UP (ref 10.6–13.6)
RBC # BLD: 4.96 M/UL — SIGNIFICANT CHANGE UP (ref 3.8–5.2)
RBC # FLD: 12.5 % — SIGNIFICANT CHANGE UP (ref 10.3–14.5)
SODIUM SERPL-SCNC: 140 MMOL/L — SIGNIFICANT CHANGE UP (ref 135–145)
SP GR SPEC: 1.02 — SIGNIFICANT CHANGE UP (ref 1.01–1.02)
UROBILINOGEN FLD QL: NEGATIVE MG/DL — SIGNIFICANT CHANGE UP
WBC # BLD: 8.07 K/UL — SIGNIFICANT CHANGE UP (ref 3.8–10.5)
WBC # FLD AUTO: 8.07 K/UL — SIGNIFICANT CHANGE UP (ref 3.8–10.5)

## 2021-01-07 PROCEDURE — 86850 RBC ANTIBODY SCREEN: CPT

## 2021-01-07 PROCEDURE — 80048 BASIC METABOLIC PNL TOTAL CA: CPT

## 2021-01-07 PROCEDURE — 36415 COLL VENOUS BLD VENIPUNCTURE: CPT

## 2021-01-07 PROCEDURE — 86901 BLOOD TYPING SEROLOGIC RH(D): CPT

## 2021-01-07 PROCEDURE — 86900 BLOOD TYPING SEROLOGIC ABO: CPT

## 2021-01-07 PROCEDURE — 81001 URINALYSIS AUTO W/SCOPE: CPT

## 2021-01-07 PROCEDURE — 85025 COMPLETE CBC W/AUTO DIFF WBC: CPT

## 2021-01-07 PROCEDURE — G0463: CPT | Mod: 25

## 2021-01-07 PROCEDURE — 85730 THROMBOPLASTIN TIME PARTIAL: CPT

## 2021-01-07 PROCEDURE — 85610 PROTHROMBIN TIME: CPT

## 2021-01-07 NOTE — H&P PST ADULT - NSICDXPASTMEDICALHX_GEN_ALL_CORE_FT
PAST MEDICAL HISTORY:  Anxiety     Breast cancer treated with tamoxifen in 2000- Now on Letrozole    Hyperlipidemia, unspecified hyperlipidemia type     Hypertension, unspecified type not on medication  "white coat syndrome"    Hypothyroidism, unspecified type     Invasive ductal carcinoma of breast ER + HER2 negative

## 2021-01-07 NOTE — H&P PST ADULT - HEALTH CARE MAINTENANCE
receive flu shot this year     Denies travel outside of state or country in the last 14 days.   Denies contact with known Coronavirus positive person.  Denies fever, chills, cough, congestion, runny nose, SOB or difficulty breathing, fatigue, muscle or body ache, headache. loss of taste or smell, N/V/D.

## 2021-01-07 NOTE — H&P PST ADULT - HISTORY OF PRESENT ILLNESS
75 year old woman with personal history of breast cancer s/p bilateral mastectomy with breast expanders in 10/2020 presents to PST for preprocedure exam. Patient is for planned right delayed breast implants and right fat grafting with Dr Saul. Patient currently on Letrozole for breast cancer. She denies pain or any acute complaints.

## 2021-01-07 NOTE — H&P PST ADULT - NSICDXPASTSURGICALHX_GEN_ALL_CORE_FT
PAST SURGICAL HISTORY:  H/O bilateral mastectomy with tissue expander placement  2020    H/O left cataract extraction 5/2018    S/P bilateral breast lumpectomy 2/2000 and 4/2003

## 2021-01-07 NOTE — H&P PST ADULT - ASSESSMENT
75 year old woman with personal history of breast cancer s/p bilateral mastectomy with breast expanders in 10/2020 presents to PST for preprocedure exam.        Plan:  - PST instructions given ; NPO status instructions to be given by ASU .  - Pt instructed to take following meds with sip of water : levothyroxine  - Pt instructed to take routine evening medications unless indicated .  -  Stop NSAIDS ( Aspirin Alev Motrin Mobic Diclofenac), herbal supplements , MVI , Vitamin fish oil 7 days prior to surgery  unless   directed by surgeon or cardiologist;   - Medical Optimization  with Dr Vasquez  - EZ wash instructions given & mupirocin instructions given.  - Labs EKG CXR as per surgeon request. copy of EKG and CXray from 10/2020 in patients chart  -  Pt instructed to self quarantine .  - Covid Testing scheduled on ___1/10______.  Pt notified and aware.  - Pt denies covid symptoms shortness of breath fever cough .

## 2021-01-08 DIAGNOSIS — Z85.3 PERSONAL HISTORY OF MALIGNANT NEOPLASM OF BREAST: ICD-10-CM

## 2021-01-08 DIAGNOSIS — Z90.11 ACQUIRED ABSENCE OF RIGHT BREAST AND NIPPLE: ICD-10-CM

## 2021-01-10 ENCOUNTER — APPOINTMENT (OUTPATIENT)
Dept: DISASTER EMERGENCY | Facility: CLINIC | Age: 76
End: 2021-01-10

## 2021-01-10 DIAGNOSIS — Z01.818 ENCOUNTER FOR OTHER PREPROCEDURAL EXAMINATION: ICD-10-CM

## 2021-01-11 NOTE — ASSESSMENT
[FreeTextEntry1] : 1.infiltrating ductal carcinoma of the right breast: Status post mastectomy with tissue expander reconstruction. Patient is being scheduled for tissue expander removal and implant placement on January 13 with Dr. Saul at [facility]. She will call his back with her presurgical testing date so that we can send addend clearance note to Dr. Saul, Fax: [phone].\par Continue on Letrozole and f/u with oncology. \par Patient advised to hold aspirin, all NSAIDs including Motrin, naproxen, Aleve, ibuprofen, Advil and fish oil 7 days prior to surgery.\par Adequate oxygen monitoring. DVT prophylaxis.\par Continue current medications as directed.\par Covid 19 nasopharyngeal swab per Surgery.\par \par 2.health maintenance: Pneumonia 23 vaccine given today, she'll follow up next week for influenza vaccine. Discussed possible side effects of vaccine\par \par Addendum 1/11/21: cbc, bmp, pt/inr, UA within normal limits. Pt is mild risk for intermediate risk procedure.\par Patient optimized for surgery.

## 2021-01-11 NOTE — HISTORY OF PRESENT ILLNESS
[FreeTextEntry1] : ALFREDITO BRAGA is a 75 year F who comes in for a follow up visit.\par  [de-identified] : ALFREDITO BRAGA is a 75 year F who comes in for a follow up visit and preoperative evaluation. \par Pt had right skin sparing mastectomy, sentinel biopsy on 10/7/20 by . Surgery/procedure went well with no complications. \par She also follows with , plastic sx, and had her drains removed 2-3 weeks post op. She is now scheduled for 1/13/20 at  for tissue expander removal and implant placement with . She does not have her  PST date yet. \par Saw  and does not want to restart chemo but did start on letrozole 2.5 mg daily. \par Patient denies any cp, sob,abdominal pain, nausea, vomiting, palpitations, fever, chills, constipation, diarrhea.\par Anesthesia History: Ms. ALFREDITO BRAGA has had no adverse effects to anesthesia in the past. \par \par Functional Capacity-Walking 1-2 blocks or climbing stairs symptoms: Ms. ALFREDITO BRAGA denies any symptoms of chest pain, BAUTISTA, SOB or palpitations.\par \par

## 2021-01-12 LAB — SARS-COV-2 N GENE NPH QL NAA+PROBE: NOT DETECTED

## 2021-01-12 RX ORDER — FENTANYL CITRATE 50 UG/ML
50 INJECTION INTRAVENOUS
Refills: 0 | Status: DISCONTINUED | OUTPATIENT
Start: 2021-01-13 | End: 2021-01-13

## 2021-01-12 RX ORDER — OXYCODONE HYDROCHLORIDE 5 MG/1
5 TABLET ORAL ONCE
Refills: 0 | Status: DISCONTINUED | OUTPATIENT
Start: 2021-01-13 | End: 2021-01-13

## 2021-01-12 RX ORDER — ONDANSETRON 8 MG/1
4 TABLET, FILM COATED ORAL ONCE
Refills: 0 | Status: DISCONTINUED | OUTPATIENT
Start: 2021-01-13 | End: 2021-01-13

## 2021-01-12 RX ORDER — SODIUM CHLORIDE 9 MG/ML
1000 INJECTION, SOLUTION INTRAVENOUS
Refills: 0 | Status: DISCONTINUED | OUTPATIENT
Start: 2021-01-13 | End: 2021-01-13

## 2021-01-12 RX ORDER — OXYCODONE HYDROCHLORIDE 5 MG/1
10 TABLET ORAL ONCE
Refills: 0 | Status: DISCONTINUED | OUTPATIENT
Start: 2021-01-13 | End: 2021-01-13

## 2021-01-13 ENCOUNTER — RESULT REVIEW (OUTPATIENT)
Age: 76
End: 2021-01-13

## 2021-01-13 ENCOUNTER — OUTPATIENT (OUTPATIENT)
Dept: INPATIENT UNIT | Facility: HOSPITAL | Age: 76
LOS: 1 days | Discharge: HOME | End: 2021-01-13
Payer: MEDICARE

## 2021-01-13 VITALS
SYSTOLIC BLOOD PRESSURE: 158 MMHG | WEIGHT: 105.82 LBS | HEIGHT: 60 IN | DIASTOLIC BLOOD PRESSURE: 87 MMHG | OXYGEN SATURATION: 100 % | RESPIRATION RATE: 16 BRPM | HEART RATE: 86 BPM | TEMPERATURE: 99 F

## 2021-01-13 VITALS
DIASTOLIC BLOOD PRESSURE: 74 MMHG | HEART RATE: 72 BPM | RESPIRATION RATE: 15 BRPM | SYSTOLIC BLOOD PRESSURE: 149 MMHG | TEMPERATURE: 98 F | OXYGEN SATURATION: 96 %

## 2021-01-13 DIAGNOSIS — Z98.42 CATARACT EXTRACTION STATUS, LEFT EYE: Chronic | ICD-10-CM

## 2021-01-13 DIAGNOSIS — Z90.11 ACQUIRED ABSENCE OF RIGHT BREAST AND NIPPLE: ICD-10-CM

## 2021-01-13 DIAGNOSIS — Z85.3 PERSONAL HISTORY OF MALIGNANT NEOPLASM OF BREAST: ICD-10-CM

## 2021-01-13 DIAGNOSIS — Z90.13 ACQUIRED ABSENCE OF BILATERAL BREASTS AND NIPPLES: Chronic | ICD-10-CM

## 2021-01-13 DIAGNOSIS — Z98.890 OTHER SPECIFIED POSTPROCEDURAL STATES: Chronic | ICD-10-CM

## 2021-01-13 PROCEDURE — C1789: CPT

## 2021-01-13 PROCEDURE — 88300 SURGICAL PATH GROSS: CPT | Mod: 26

## 2021-01-13 PROCEDURE — 88300 SURGICAL PATH GROSS: CPT

## 2021-01-13 RX ORDER — LEVOTHYROXINE SODIUM 125 MCG
1 TABLET ORAL
Qty: 0 | Refills: 0 | DISCHARGE

## 2021-01-13 RX ORDER — SERTRALINE 25 MG/1
1 TABLET, FILM COATED ORAL
Qty: 0 | Refills: 0 | DISCHARGE

## 2021-01-13 RX ORDER — SODIUM CHLORIDE 9 MG/ML
1000 INJECTION, SOLUTION INTRAVENOUS
Refills: 0 | Status: DISCONTINUED | OUTPATIENT
Start: 2021-01-13 | End: 2021-01-13

## 2021-01-13 RX ORDER — CETIRIZINE HYDROCHLORIDE 10 MG/1
1 TABLET ORAL
Qty: 0 | Refills: 0 | DISCHARGE

## 2021-01-13 RX ORDER — ACETAMINOPHEN 500 MG
1000 TABLET ORAL ONCE
Refills: 0 | Status: COMPLETED | OUTPATIENT
Start: 2021-01-13 | End: 2021-01-13

## 2021-01-13 RX ORDER — ATORVASTATIN CALCIUM 80 MG/1
1 TABLET, FILM COATED ORAL
Qty: 0 | Refills: 0 | DISCHARGE

## 2021-01-13 RX ORDER — LETROZOLE 2.5 MG/1
1 TABLET, FILM COATED ORAL
Qty: 0 | Refills: 0 | DISCHARGE

## 2021-01-13 RX ORDER — CHOLECALCIFEROL (VITAMIN D3) 125 MCG
1 CAPSULE ORAL
Qty: 0 | Refills: 0 | DISCHARGE

## 2021-01-13 RX ADMIN — OXYCODONE HYDROCHLORIDE 5 MILLIGRAM(S): 5 TABLET ORAL at 15:07

## 2021-01-13 RX ADMIN — SODIUM CHLORIDE 125 MILLILITER(S): 9 INJECTION, SOLUTION INTRAVENOUS at 15:01

## 2021-01-13 RX ADMIN — Medication 400 MILLIGRAM(S): at 15:14

## 2021-01-13 NOTE — ASU PATIENT PROFILE, ADULT - PSH
H/O bilateral mastectomy  with tissue expander placement  2020  H/O left cataract extraction  5/2018  S/P bilateral breast lumpectomy  2/2000 and 4/2003

## 2021-01-13 NOTE — ASU DISCHARGE PLAN (ADULT/PEDIATRIC) - CARE PROVIDER_API CALL
Norman Saul)  Plastic Surgery; Surgery  [address], [address]  [place] [address]  Phone: [phone]  Fax: [phone]  Follow Up Time:

## 2021-01-13 NOTE — ASU PATIENT PROFILE, ADULT - PMH
Anxiety    Breast cancer  treated with tamoxifen in 2000- Now on Letrozole  Hyperlipidemia, unspecified hyperlipidemia type    Hypertension, unspecified type  not on medication  "white coat syndrome"  Hypothyroidism, unspecified type    Invasive ductal carcinoma of breast  ER + HER2 negative

## 2021-01-18 DIAGNOSIS — I10 ESSENTIAL (PRIMARY) HYPERTENSION: ICD-10-CM

## 2021-01-18 DIAGNOSIS — Z88.8 ALLERGY STATUS TO OTHER DRUGS, MEDICAMENTS AND BIOLOGICAL SUBSTANCES: ICD-10-CM

## 2021-01-18 DIAGNOSIS — Z85.3 PERSONAL HISTORY OF MALIGNANT NEOPLASM OF BREAST: ICD-10-CM

## 2021-01-18 DIAGNOSIS — Z42.1 ENCOUNTER FOR BREAST RECONSTRUCTION FOLLOWING MASTECTOMY: ICD-10-CM

## 2021-01-18 DIAGNOSIS — Z92.3 PERSONAL HISTORY OF IRRADIATION: ICD-10-CM

## 2021-01-18 DIAGNOSIS — E78.5 HYPERLIPIDEMIA, UNSPECIFIED: ICD-10-CM

## 2021-01-18 DIAGNOSIS — Z90.11 ACQUIRED ABSENCE OF RIGHT BREAST AND NIPPLE: ICD-10-CM

## 2021-01-18 DIAGNOSIS — E03.9 HYPOTHYROIDISM, UNSPECIFIED: ICD-10-CM

## 2021-01-18 DIAGNOSIS — Z92.21 PERSONAL HISTORY OF ANTINEOPLASTIC CHEMOTHERAPY: ICD-10-CM

## 2021-07-30 PROBLEM — C50.919 MALIGNANT NEOPLASM OF UNSPECIFIED SITE OF UNSPECIFIED FEMALE BREAST: Chronic | Status: ACTIVE | Noted: 2021-01-07

## 2021-07-30 PROBLEM — C50.919 MALIGNANT NEOPLASM OF UNSPECIFIED SITE OF UNSPECIFIED FEMALE BREAST: Chronic | Status: ACTIVE | Noted: 2019-05-06

## 2021-08-31 ENCOUNTER — APPOINTMENT (OUTPATIENT)
Dept: BREAST CENTER | Facility: CLINIC | Age: 76
End: 2021-08-31
Payer: MEDICARE

## 2021-08-31 VITALS
DIASTOLIC BLOOD PRESSURE: 97 MMHG | HEART RATE: 93 BPM | BODY MASS INDEX: 19.88 KG/M2 | HEIGHT: 62 IN | SYSTOLIC BLOOD PRESSURE: 180 MMHG | WEIGHT: 108 LBS

## 2021-08-31 PROCEDURE — 99214 OFFICE O/P EST MOD 30 MIN: CPT

## 2021-08-31 RX ORDER — SERTRALINE HYDROCHLORIDE 50 MG/1
50 TABLET, FILM COATED ORAL
Refills: 0 | Status: DISCONTINUED | COMMUNITY
Start: 2020-09-01 | End: 2021-08-31

## 2021-08-31 NOTE — PHYSICAL EXAM
[Normocephalic] : normocephalic [Atraumatic] : atraumatic [EOMI] : extra ocular movement intact [Sclera nonicteric] : sclera nonicteric [Supple] : supple [No Supraclavicular Adenopathy] : no supraclavicular adenopathy [No Cervical Adenopathy] : no cervical adenopathy [Clear to Auscultation Bilat] : clear to auscultation bilaterally [Normal Sinus Rhythm] : normal sinus rhythm [No Rubs] : no pericardial rub [Examined in the supine and seated position] : examined in the supine and seated position [No dominant masses] : no dominant masses in right breast  [No dominant masses] : no dominant masses left breast [No Nipple Retraction] : no left nipple retraction [No Nipple Discharge] : no left nipple discharge [No Axillary Lymphadenopathy] : no left axillary lymphadenopathy [Soft] : abdomen soft [Not Tender] : non-tender [No Edema] : no edema [No Rashes] : no rashes [No Ulceration] : no ulceration [Asymmetrical] : asymmetrical [de-identified] : Vertical scar.  [de-identified] : Transverse scar [de-identified] : Trasnverse scar

## 2021-08-31 NOTE — DATA REVIEWED
[FreeTextEntry1] : SURGICAL PATHOLOGY: 10/7/20    RESULTS:  [place] node # 1 - negative for metastases.  NOnsentinel node # 1 - negative for metastases.   Right mastectomy - 2.5 cm Infiltrating ductal carcinoma, moderately differentiated.( 6-7/9).  Margins of resection are negative.    ER -50%, positive;  NC - < 1%, negative.  HER2 - negative.

## 2021-08-31 NOTE — HISTORY OF PRESENT ILLNESS
[FreeTextEntry1] : Ms. Guillory is a 75 year old woman here for a followup for surveillance for breast cancer. Her breast history is \par notable for benign bilateral breast needle biopsies and significant for\par \par 1) L breast infiltrating carcinoma with ductal and lobular features diagnosed in 2000 at age 55, pathologic stage I , pT1 pN0, ER 10%, FL negative, Her2 0\par - s/p L lumpectomy, axillary node dissection (2/4/2000, Dr. Washington) = 1.2 cm tumor, 0/12 LN\par - s/p chemotherapy (Dr. Jerry)\par - s/p radiation (Dr. Rivers)\par - s/p tamoxifen x 3 years\par \par 2.) R breast infiltrating ductal carcinoma diagnosed in 2003 at age 58, pathologic stage I, pT1 pN0, ER negative, FL negative, Her2 0, Jigar 7/9\par - s/p R lumpectomy, sentinel node biopsy (4/18/03, Dr. Washington) = 2.0 cm tumor, 0/1 R sln\par - s/p chemotherapy\par - s/p radiation\par \par 3.) R breast infiltrating ductal carcinoma, diagnosed in 2020 at age 73, pathologic stage II, pT2 pN0, ER 50%, FL <1%, Her2 negative, [place] 6/9\par - s/p R mastectomy, sentinel node biopsy (10/7/20, Dr. Washington), and implant based reconstruction (Dr. Saul) = 2.5 cm tumor, 0/2 R sln\par \par She is without any breast complaints. She has some back pain from a herniated disc.\par \par She declined genetic testing.

## 2021-11-08 ENCOUNTER — APPOINTMENT (OUTPATIENT)
Dept: INTERNAL MEDICINE | Facility: CLINIC | Age: 76
End: 2021-11-08
Payer: MEDICARE

## 2021-11-08 VITALS
HEART RATE: 78 BPM | BODY MASS INDEX: 19.69 KG/M2 | WEIGHT: 107 LBS | HEIGHT: 62 IN | DIASTOLIC BLOOD PRESSURE: 84 MMHG | TEMPERATURE: 99 F | SYSTOLIC BLOOD PRESSURE: 164 MMHG | OXYGEN SATURATION: 96 %

## 2021-11-08 VITALS — DIASTOLIC BLOOD PRESSURE: 82 MMHG | SYSTOLIC BLOOD PRESSURE: 138 MMHG

## 2021-11-08 PROCEDURE — 99214 OFFICE O/P EST MOD 30 MIN: CPT | Mod: 25

## 2021-11-08 PROCEDURE — G0444 DEPRESSION SCREEN ANNUAL: CPT | Mod: 59

## 2021-11-08 PROCEDURE — 90662 IIV NO PRSV INCREASED AG IM: CPT

## 2021-11-08 PROCEDURE — G0008: CPT

## 2021-11-08 NOTE — ASSESSMENT
[FreeTextEntry1] : 1.HM: Influenza vaccine administered today. Went over side effects of vaccine.\par \par 2.Hypothyroidism: recheck TFTs, continue on Synthroid 50 mcg once daily, refill today. \par \par 3.HLD: recheck lipids today and atorvastatin 10 mg today.  Patient advised on low cholesterol diet-decrease in white carbs and exercise 150 minutes per week.\par \par 4.Breast ca b/l: s/p surgery, recent mammogram reviewed, f/u with breast surgery and oncology, c/w letrozole.

## 2021-11-08 NOTE — HISTORY OF PRESENT ILLNESS
[FreeTextEntry1] : FU [de-identified] : ALFREDITO BRAGA is a 76 year F who comes in for a follow up visit.\par She recently had a mammogram end of sept which was stable and now is seeing  for breast surgery. \par She needs medicaiton refills today.\par Has not had bw in over a year.\yash Does see  for oncology. \par Patient denies any cp, sob,abdominal pain, nausea, vomiting, palpitations, fever, chills, constipation, diarrhea.\par

## 2021-11-22 LAB
25(OH)D3 SERPL-MCNC: 51 NG/ML
ALBUMIN SERPL ELPH-MCNC: 4.7 G/DL
ALP BLD-CCNC: 74 U/L
ALT SERPL-CCNC: 14 U/L
ANION GAP SERPL CALC-SCNC: 12 MMOL/L
AST SERPL-CCNC: 17 U/L
BASOPHILS # BLD AUTO: 0.03 K/UL
BASOPHILS NFR BLD AUTO: 0.4 %
BILIRUB SERPL-MCNC: 0.3 MG/DL
BUN SERPL-MCNC: 19 MG/DL
CALCIUM SERPL-MCNC: 10.2 MG/DL
CHLORIDE SERPL-SCNC: 106 MMOL/L
CHOLEST SERPL-MCNC: 192 MG/DL
CO2 SERPL-SCNC: 24 MMOL/L
CREAT SERPL-MCNC: 0.85 MG/DL
EOSINOPHIL # BLD AUTO: 0.09 K/UL
EOSINOPHIL NFR BLD AUTO: 1.2 %
GLUCOSE SERPL-MCNC: 98 MG/DL
HCT VFR BLD CALC: 45.2 %
HDLC SERPL-MCNC: 89 MG/DL
HGB BLD-MCNC: 14.8 G/DL
IMM GRANULOCYTES NFR BLD AUTO: 0.3 %
LDLC SERPL CALC-MCNC: 79 MG/DL
LYMPHOCYTES # BLD AUTO: 2.39 K/UL
LYMPHOCYTES NFR BLD AUTO: 32.9 %
MAN DIFF?: NORMAL
MCHC RBC-ENTMCNC: 32.2 PG
MCHC RBC-ENTMCNC: 32.7 GM/DL
MCV RBC AUTO: 98.5 FL
MONOCYTES # BLD AUTO: 0.66 K/UL
MONOCYTES NFR BLD AUTO: 9.1 %
NEUTROPHILS # BLD AUTO: 4.08 K/UL
NEUTROPHILS NFR BLD AUTO: 56.1 %
NONHDLC SERPL-MCNC: 104 MG/DL
PLATELET # BLD AUTO: 320 K/UL
POTASSIUM SERPL-SCNC: 4.1 MMOL/L
PROT SERPL-MCNC: 6.9 G/DL
RBC # BLD: 4.59 M/UL
RBC # FLD: 12.5 %
SODIUM SERPL-SCNC: 142 MMOL/L
T4 FREE SERPL-MCNC: 1.2 NG/DL
TRIGL SERPL-MCNC: 125 MG/DL
TSH SERPL-ACNC: 1.47 UIU/ML
WBC # FLD AUTO: 7.27 K/UL

## 2021-11-23 ENCOUNTER — NON-APPOINTMENT (OUTPATIENT)
Age: 76
End: 2021-11-23

## 2021-12-28 NOTE — ASU PREOP CHECKLIST - CHLOROHEXIDINE WASH 3
Orthopedic surgery clinic patient visit :    [facility], [address]  [address] [place] [address]  [phone]  [phone]  12/28/2021 2:41 PM    SUBJECTIVE:  He is  a 23 year old male who presented for follow-up evaluation for an injury to his left hand.  A drill bit broke off on his drill and the remaining portion of the drill bit entered his palm of his hand.  He had a fairly deep puncture wound that resulted in numbness in the ulnar nerve distribution of his hand most notably his to digits.  He stated that after several days the sensation came back completely and he feels that he has return to normal function.  The puncture wound does not appear to be infected and he has minimal tenderness.    I have reviewed the patient's medications and allergies, past medical, surgical, social and family history, updating these as appropriate.  See Histories section of the electronic medical record for a display of this information.    OBJECTIVE:    PHYSICAL EXAM:  There were no vitals taken for this visit.  Constitutional:  Well developed, well nourished male in no acute distress.  Skin: Warm, dry, intact without rash or lesion.  Neurologic:  Alert & oriented to person, place and time.   Psychiatric:  Speech and behavior appropriate.   Musculoskeletal:  Examination of the patient's left hand today reveals a small benign-appearing puncture wound to the palm of his hand between the thenar and hypothenar eminences.  He has excellent strength against resisted MCP joint extension and abduction of the digits.  He has good sensation.  There is no evidence of infection or drainage.    Assessment:  Left hand puncture wound    Plan:  The patient is doing well and has no nerve damage/injury.  He was offered reassurance has normal function in use of his hand and has minimal discomfort.  He will return to work tomorrow with no limitations.  Return if  symptoms worsen or fail to improve.  Instructions noted per After Visit Summary/patient instructions.  The patient indicates understanding of these issues and agrees with the plan.    Raghu Tirado M.D. serves as my supervising physician and was available for questions regarding formulation of diagnosis and plan, and review of patient care. If Dr. Raghu Tirado is unavailable, an alternate supervising physician will cover Dr. Raghu Tirado's responsibilities based on established yearly written agreement and daily availability of said physicians.     13-Jan-2021

## 2022-02-03 ENCOUNTER — APPOINTMENT (OUTPATIENT)
Dept: SURGERY | Facility: CLINIC | Age: 77
End: 2022-02-03
Payer: MEDICARE

## 2022-02-03 DIAGNOSIS — L72.0 EPIDERMAL CYST: ICD-10-CM

## 2022-02-03 DIAGNOSIS — L08.9 EPIDERMAL CYST: ICD-10-CM

## 2022-02-03 PROCEDURE — 10060 I&D ABSCESS SIMPLE/SINGLE: CPT

## 2022-02-17 NOTE — ASU DISCHARGE PLAN (ADULT/PEDIATRIC) - FREQUENT HAND WASHING PREVENTS THE SPREAD OF INFECTION.
02/17/2022: Day 2 of consolidation 2 of SHAYAN. Tolerating IDAC well thus far. No evidence of neurotoxicity on exam. Replacing phos per prn electrolyte replacement order set. C-diff stool sample collection discontinued per protocol as stool is not liquid. C/o headache and RA pain. Giving one time dose fo ibuprofen. SW assisting with insurance issues.   02/18/2022: Day 3 of consolidation 2 of SHAYAN. Continues to tolerate IDAC well. Afebrile. VSS. Will attempt to get port placement for future cycles given patient's inability to afford PICC line supplies in the long term. SW to pursue patient assistance for PICC supplies following this cycle, but this is no a viable long term solution. Replacing mag and phos.  2/19/2022 Day 4 of Consolidation 2 of SHAYAN. CALE. She has no complaints today. Denies headaches, dizziness, or unstable gait. VSS, afebrile.   2/20/2022 Day 5 of consolidation 2 of RODGER MADSEN. Has no complaints. Will receive D5 of IDAC this evening. Anticipate discharge tomorrow  morning after her last dose.   02/21/2022: C2D6 of consolidation #2 of SHAYAN (IDAC). She completed chemo early this morning and will discharge home today. Tolerating chemo well thus far. Main complaint was headache over night for which she was given Norco, followed by Ibuprofen, followed by Tylenol. Patient states that headache resolved after sleeping. Explained to her that she cannont take any of these meds after discharge given anticipated neutropenia and thrombocytopenia. Afebrile. VSS. She will get twice weekly labs and dressing changes outpatient with Dr. Garcia.  
Statement Selected

## 2022-03-24 ENCOUNTER — APPOINTMENT (OUTPATIENT)
Dept: BREAST CENTER | Facility: CLINIC | Age: 77
End: 2022-03-24
Payer: MEDICARE

## 2022-03-24 VITALS
HEART RATE: 76 BPM | SYSTOLIC BLOOD PRESSURE: 165 MMHG | BODY MASS INDEX: 19.69 KG/M2 | DIASTOLIC BLOOD PRESSURE: 84 MMHG | WEIGHT: 107 LBS | HEIGHT: 62 IN

## 2022-03-24 PROCEDURE — 99214 OFFICE O/P EST MOD 30 MIN: CPT

## 2022-03-24 NOTE — PHYSICAL EXAM
[Normocephalic] : normocephalic [Atraumatic] : atraumatic [Sclera nonicteric] : sclera nonicteric [Supple] : supple [No Supraclavicular Adenopathy] : no supraclavicular adenopathy [No Cervical Adenopathy] : no cervical adenopathy [Clear to Auscultation Bilat] : clear to auscultation bilaterally [Normal Sinus Rhythm] : normal sinus rhythm [No Rubs] : no pericardial rub [Examined in the supine and seated position] : examined in the supine and seated position [Asymmetrical] : asymmetrical [No dominant masses] : no dominant masses in right breast  [No dominant masses] : no dominant masses left breast [No Nipple Retraction] : no left nipple retraction [No Nipple Discharge] : no left nipple discharge [No Axillary Lymphadenopathy] : no left axillary lymphadenopathy [Soft] : abdomen soft [Not Tender] : non-tender [No Edema] : no edema [No Rashes] : no rashes [No Ulceration] : no ulceration [de-identified] : Vertical scar. Implant [de-identified] : Lateral periareolar scar with dense scar tissue.  [de-identified] : Transverse scar [de-identified] : Trasnverse scar

## 2022-03-24 NOTE — DATA REVIEWED
[FreeTextEntry1] : SURGICAL PATHOLOGY: 10/7/20    RESULTS:  [place] node # 1 - negative for metastases.  NOnsentinel node # 1 - negative for metastases.   Right mastectomy - 2.5 cm Infiltrating ductal carcinoma, moderately differentiated.( 6-7/9).  Margins of resection are negative.    ER -50%, positive;  UT - < 1%, negative.  HER2 - negative.  \par \par I have independently reviewed the reports and the images. \par \par L mammogram and US 9/24/21\par - heterogenously dense\par - BIRADS 2

## 2022-03-24 NOTE — HISTORY OF PRESENT ILLNESS
[FreeTextEntry1] : Ms. Guillory is a 76 year old woman here for a followup for surveillance for breast cancer. Her breast history is \par notable for benign bilateral breast needle biopsies and significant for\par \par 1) L breast infiltrating carcinoma with ductal and lobular features diagnosed in 2000 at age 55, pathologic stage I , pT1 pN0, ER 10%, SC negative, Her2 0\par - s/p L lumpectomy, axillary node dissection (2/4/2000, Dr. Washington) = 1.2 cm tumor, 0/12 LN\par - s/p chemotherapy (Dr. Jerry)\par - s/p radiation (Dr. Rivers)\par - s/p tamoxifen x 3 years\par \par 2.) R breast infiltrating ductal carcinoma diagnosed in 2003 at age 58, pathologic stage I, pT1 pN0, ER negative, SC negative, Her2 0, Jigar 7/9\par - s/p R lumpectomy, sentinel node biopsy (4/18/03, Dr. Washington) = 2.0 cm tumor, 0/1 R sln\par - s/p chemotherapy\par - s/p radiation\par \par 3.) R breast infiltrating ductal carcinoma, diagnosed in 2020 at age 73, pathologic stage II, pT2 pN0, ER 50%, SC <1%, Her2 negative, [place] 6/9\par - s/p R mastectomy, sentinel node biopsy (10/7/20, Dr. Washington), and implant based reconstruction (Dr. Saul) = 2.5 cm tumor, 0/2 R sln\par \par She is without any breast complaints. She has back pain and plans to get back into walking as it was very helpful for her back as well as her mind with stress and anxiety. \par \par She had declined genetic testing.

## 2022-04-21 NOTE — ASU PATIENT PROFILE, ADULT - PRESSURE ULCER(S)
GOSIA following for OP HD needs. Patient and family wanting referral to [address] [place] [address] ([facility]) for OP HD twice a week. Want Monday/Friday schedule. GOSIA faxed updated Hep labs and flowsheets requested to [address] Admissions. [facility] accepted MWF 12:00pm chair beginning 4/25/22. Need confirmation if Nephrology will approve patient's request of running 2 days vs. 3 days per week. 1  GOSIA confirmed with Dr. Elizabeth Walls that patient can treat on Mondays and Fridays upon discharge. GOSIA spoke with Jose Ivory at [address] [place] [address] ([facility]), schedule should not be a problem but will notify FA of information. no

## 2022-04-29 ENCOUNTER — RX RENEWAL (OUTPATIENT)
Age: 77
End: 2022-04-29

## 2022-05-07 ENCOUNTER — NON-APPOINTMENT (OUTPATIENT)
Age: 77
End: 2022-05-07

## 2022-06-03 ENCOUNTER — APPOINTMENT (OUTPATIENT)
Dept: INTERNAL MEDICINE | Facility: CLINIC | Age: 77
End: 2022-06-03
Payer: MEDICARE

## 2022-06-03 VITALS
BODY MASS INDEX: 19.88 KG/M2 | DIASTOLIC BLOOD PRESSURE: 80 MMHG | OXYGEN SATURATION: 98 % | WEIGHT: 108 LBS | HEIGHT: 62 IN | SYSTOLIC BLOOD PRESSURE: 162 MMHG | HEART RATE: 92 BPM | TEMPERATURE: 98.6 F | RESPIRATION RATE: 17 BRPM

## 2022-06-03 VITALS — DIASTOLIC BLOOD PRESSURE: 84 MMHG | SYSTOLIC BLOOD PRESSURE: 140 MMHG

## 2022-06-03 PROCEDURE — 99214 OFFICE O/P EST MOD 30 MIN: CPT

## 2022-06-03 RX ORDER — LETROZOLE TABLETS 2.5 MG/1
2.5 TABLET, FILM COATED ORAL
Refills: 0 | Status: ACTIVE | COMMUNITY
Start: 2020-10-30

## 2022-06-03 NOTE — HISTORY OF PRESENT ILLNESS
[FreeTextEntry1] : FU [de-identified] : ALFREDITO BRAGA is a 76 year F who comes in for a follow up visit.\par Pt has questions today regarding 2nd booster for covid vaccine. \par She did see breast sx recently and all was stable and had mammogram 9/2021. \par She is due for refills and bw today. \par She notes her increase in her LBP recently due to lack of activity. She has been using otc topical and heating pad and back pain is much better. She has arthritis of hand and right hand 1st digit wore finger brace which helped with sx. \par Patient denies any cp, sob,abdominal pain, nausea, vomiting, palpitations, fever, chills, constipation, diarrhea.\par

## 2022-06-03 NOTE — ASSESSMENT
[FreeTextEntry1] : 1.HM: Discussed recommendations of COVID vaccine second booster. Discussed fasting blood work to get.\par \par 2.Hypothyroidism: recheck TFTs, continue on Synthroid 50 mcg once daily, refill today. \par \par 3.HLD: recheck lipids today and atorvastatin 10 mg today.  Patient advised on low cholesterol diet-decrease in white carbs and exercise 150 minutes per week.\par \par 4.Breast ca b/l: s/p surgery, recent mammogram reviewed, f/u with breast surgery and oncology, c/w letrozole.

## 2022-06-10 ENCOUNTER — NON-APPOINTMENT (OUTPATIENT)
Age: 77
End: 2022-06-10

## 2022-06-14 LAB
25(OH)D3 SERPL-MCNC: 46.7 NG/ML
ALBUMIN SERPL ELPH-MCNC: 4.9 G/DL
ALP BLD-CCNC: 74 U/L
ALT SERPL-CCNC: 16 U/L
ANION GAP SERPL CALC-SCNC: 13 MMOL/L
AST SERPL-CCNC: 22 U/L
BASOPHILS # BLD AUTO: 0.05 K/UL
BASOPHILS NFR BLD AUTO: 0.7 %
BILIRUB SERPL-MCNC: 0.4 MG/DL
BUN SERPL-MCNC: 19 MG/DL
CALCIUM SERPL-MCNC: 10.1 MG/DL
CHLORIDE SERPL-SCNC: 104 MMOL/L
CHOLEST SERPL-MCNC: 196 MG/DL
CO2 SERPL-SCNC: 24 MMOL/L
CREAT SERPL-MCNC: 0.78 MG/DL
EGFR: 79 ML/MIN/1.73M2
EOSINOPHIL # BLD AUTO: 0.11 K/UL
EOSINOPHIL NFR BLD AUTO: 1.5 %
ESTIMATED AVERAGE GLUCOSE: 126 MG/DL
GLUCOSE SERPL-MCNC: 102 MG/DL
HBA1C MFR BLD HPLC: 6 %
HCT VFR BLD CALC: 46.9 %
HDLC SERPL-MCNC: 85 MG/DL
HGB BLD-MCNC: 15 G/DL
IMM GRANULOCYTES NFR BLD AUTO: 0.3 %
LDLC SERPL CALC-MCNC: 88 MG/DL
LYMPHOCYTES # BLD AUTO: 2.67 K/UL
LYMPHOCYTES NFR BLD AUTO: 36 %
MAN DIFF?: NORMAL
MCHC RBC-ENTMCNC: 31.7 PG
MCHC RBC-ENTMCNC: 32 GM/DL
MCV RBC AUTO: 99.2 FL
MONOCYTES # BLD AUTO: 0.63 K/UL
MONOCYTES NFR BLD AUTO: 8.5 %
NEUTROPHILS # BLD AUTO: 3.93 K/UL
NEUTROPHILS NFR BLD AUTO: 53 %
NONHDLC SERPL-MCNC: 111 MG/DL
PLATELET # BLD AUTO: 322 K/UL
POTASSIUM SERPL-SCNC: 4.2 MMOL/L
PROT SERPL-MCNC: 6.9 G/DL
RBC # BLD: 4.73 M/UL
RBC # FLD: 12.6 %
SODIUM SERPL-SCNC: 140 MMOL/L
T4 FREE SERPL-MCNC: 1.1 NG/DL
TRIGL SERPL-MCNC: 118 MG/DL
TSH SERPL-ACNC: 1.37 UIU/ML
WBC # FLD AUTO: 7.41 K/UL

## 2022-11-15 ENCOUNTER — APPOINTMENT (OUTPATIENT)
Dept: BREAST CENTER | Facility: CLINIC | Age: 77
End: 2022-11-15

## 2022-11-15 VITALS
BODY MASS INDEX: 19.69 KG/M2 | DIASTOLIC BLOOD PRESSURE: 78 MMHG | HEIGHT: 62 IN | WEIGHT: 107 LBS | SYSTOLIC BLOOD PRESSURE: 164 MMHG | HEART RATE: 86 BPM

## 2022-11-15 PROCEDURE — 99214 OFFICE O/P EST MOD 30 MIN: CPT

## 2022-11-17 NOTE — PHYSICAL EXAM
[Normocephalic] : normocephalic [Atraumatic] : atraumatic [Sclera nonicteric] : sclera nonicteric [Supple] : supple [No Supraclavicular Adenopathy] : no supraclavicular adenopathy [Clear to Auscultation Bilat] : clear to auscultation bilaterally [No Cervical Adenopathy] : no cervical adenopathy [Normal Sinus Rhythm] : normal sinus rhythm [No Rubs] : no pericardial rub [Examined in the supine and seated position] : examined in the supine and seated position [Asymmetrical] : asymmetrical [No dominant masses] : no dominant masses in right breast  [No dominant masses] : no dominant masses left breast [No Nipple Retraction] : no left nipple retraction [No Nipple Discharge] : no left nipple discharge [No Axillary Lymphadenopathy] : no left axillary lymphadenopathy [Soft] : abdomen soft [Not Tender] : non-tender [No Edema] : no edema [No Rashes] : no rashes [No Ulceration] : no ulceration [de-identified] : Vertical scar. Implant [de-identified] : Lateral periareolar scar with dense scar tissue.  [de-identified] : Transverse scar [de-identified] : Trasnverse scar

## 2022-11-17 NOTE — HISTORY OF PRESENT ILLNESS
[FreeTextEntry1] : Ms. Guillory is a 77 year old woman here for a followup for surveillance for breast cancer. 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 Washington) = 1.2 cm tumor, 0/12 LN\par - s/p chemotherapy (Dr. Jerry)\par - s/p radiation (Dr. Rivers)\par - s/p tamoxifen x 3 years\par \par 2.) R breast infiltrating ductal carcinoma diagnosed in 2003 at age 58, pathologic stage I, pT1 pN0, ER negative, [place] negative, Her2 0, [place] 7/9\par - s/p R lumpectomy, sentinel node biopsy (4/18/03, Dr. Washington) = 2.0 cm tumor, 0/1 R sln\par - s/p chemotherapy\par - s/p radiation\par \par 3.) R breast infiltrating ductal carcinoma, diagnosed in 2020 at age 73, pathologic stage II, pT2 pN0, ER 50%, [place] <1%, Her2 negative, [place] 6/9\par - s/p R mastectomy, sentinel node biopsy (10/7/20, Dr. Washington), and implant based reconstruction (Dr. Saul) = 2.5 cm tumor, 0/2 R sln\par \par She is doing well. No lumps. She enjoys walking but has not gotten back into it since the pandemic. \par \par She had declined genetic testing.

## 2022-11-17 NOTE — CONSULT LETTER
[Dear  ___] : Dear  [unfilled], [Consult Letter:] : I had the pleasure of evaluating your patient, [unfilled]. [Please see my note below.] : Please see my note below. [Consult Closing:] : Thank you very much for allowing me to participate in the care of this patient.  If you have any questions, please do not hesitate to contact me. [Sincerely,] : Sincerely, [FreeTextEntry3] : Codie Leon MD FACS

## 2022-11-17 NOTE — DATA REVIEWED
[FreeTextEntry1] : SURGICAL PATHOLOGY: 10/7/20    RESULTS:  [place] node # 1 - negative for metastases.  [place]nsentinel node # 1 - negative for metastases.   Right mastectomy - 2.5 cm Infiltrating ductal carcinoma, moderately differentiated.( 6-7/9).  Margins of resection are negative.    ER -50%, positive;  [place] - < 1%, negative.  HER2 - negative.  \par \par I have independently reviewed the reports and the images. \par \par L mammogram and US 9/24/21\par - heterogenously dense\par - BIRADS 2

## 2022-12-28 ENCOUNTER — APPOINTMENT (OUTPATIENT)
Dept: MAMMOGRAPHY | Facility: CLINIC | Age: 77
End: 2022-12-28

## 2022-12-28 ENCOUNTER — RESULT REVIEW (OUTPATIENT)
Age: 77
End: 2022-12-28

## 2022-12-28 ENCOUNTER — APPOINTMENT (OUTPATIENT)
Dept: ULTRASOUND IMAGING | Facility: CLINIC | Age: 77
End: 2022-12-28

## 2022-12-28 ENCOUNTER — OUTPATIENT (OUTPATIENT)
Dept: OUTPATIENT SERVICES | Facility: HOSPITAL | Age: 77
LOS: 1 days | End: 2022-12-28
Payer: MEDICARE

## 2022-12-28 DIAGNOSIS — R92.2 INCONCLUSIVE MAMMOGRAM: ICD-10-CM

## 2022-12-28 DIAGNOSIS — Z98.42 CATARACT EXTRACTION STATUS, LEFT EYE: Chronic | ICD-10-CM

## 2022-12-28 DIAGNOSIS — Z98.890 OTHER SPECIFIED POSTPROCEDURAL STATES: Chronic | ICD-10-CM

## 2022-12-28 DIAGNOSIS — Z90.13 ACQUIRED ABSENCE OF BILATERAL BREASTS AND NIPPLES: Chronic | ICD-10-CM

## 2022-12-28 DIAGNOSIS — Z12.39 ENCOUNTER FOR OTHER SCREENING FOR MALIGNANT NEOPLASM OF BREAST: ICD-10-CM

## 2022-12-28 DIAGNOSIS — Z85.3 PERSONAL HISTORY OF MALIGNANT NEOPLASM OF BREAST: ICD-10-CM

## 2022-12-28 PROCEDURE — 76641 ULTRASOUND BREAST COMPLETE: CPT

## 2022-12-28 PROCEDURE — 77065 DX MAMMO INCL CAD UNI: CPT

## 2022-12-28 PROCEDURE — G0279: CPT

## 2022-12-28 PROCEDURE — 77065 DX MAMMO INCL CAD UNI: CPT | Mod: 26,LT

## 2022-12-28 PROCEDURE — 76641 ULTRASOUND BREAST COMPLETE: CPT | Mod: 26,LT

## 2022-12-28 PROCEDURE — G0279: CPT | Mod: 26

## 2023-01-26 NOTE — ASU PATIENT PROFILE, ADULT - PROVIDER NOTIFICATION
PA for Nabor Marion denied. Nabor Marion is not the formulary drug under the patient's insurance. The drug benefit plan provides coverage for Aimovig and Emgality. Can your patient's treatment be switched to a formulary drug? Declines

## 2023-02-13 ENCOUNTER — APPOINTMENT (OUTPATIENT)
Dept: RADIOLOGY | Facility: CLINIC | Age: 78
End: 2023-02-13
Payer: MEDICARE

## 2023-02-13 ENCOUNTER — OUTPATIENT (OUTPATIENT)
Dept: OUTPATIENT SERVICES | Facility: HOSPITAL | Age: 78
LOS: 1 days | End: 2023-02-13
Payer: MEDICARE

## 2023-02-13 DIAGNOSIS — Z98.890 OTHER SPECIFIED POSTPROCEDURAL STATES: Chronic | ICD-10-CM

## 2023-02-13 DIAGNOSIS — Z98.42 CATARACT EXTRACTION STATUS, LEFT EYE: Chronic | ICD-10-CM

## 2023-02-13 DIAGNOSIS — M81.0 AGE-RELATED OSTEOPOROSIS WITHOUT CURRENT PATHOLOGICAL FRACTURE: ICD-10-CM

## 2023-02-13 DIAGNOSIS — C50.011 MALIGNANT NEOPLASM OF NIPPLE AND AREOLA, RIGHT FEMALE BREAST: ICD-10-CM

## 2023-02-13 DIAGNOSIS — Z90.13 ACQUIRED ABSENCE OF BILATERAL BREASTS AND NIPPLES: Chronic | ICD-10-CM

## 2023-02-13 PROCEDURE — 77080 DXA BONE DENSITY AXIAL: CPT

## 2023-02-13 PROCEDURE — 77080 DXA BONE DENSITY AXIAL: CPT | Mod: 26

## 2023-04-10 ENCOUNTER — RX RENEWAL (OUTPATIENT)
Age: 78
End: 2023-04-10

## 2023-05-02 ENCOUNTER — APPOINTMENT (OUTPATIENT)
Dept: INTERNAL MEDICINE | Facility: CLINIC | Age: 78
End: 2023-05-02
Payer: MEDICARE

## 2023-05-02 VITALS
HEIGHT: 62 IN | BODY MASS INDEX: 19.32 KG/M2 | TEMPERATURE: 98.6 F | WEIGHT: 105 LBS | SYSTOLIC BLOOD PRESSURE: 148 MMHG | OXYGEN SATURATION: 98 % | DIASTOLIC BLOOD PRESSURE: 84 MMHG | HEART RATE: 88 BPM

## 2023-05-02 VITALS — SYSTOLIC BLOOD PRESSURE: 138 MMHG | DIASTOLIC BLOOD PRESSURE: 78 MMHG

## 2023-05-02 DIAGNOSIS — M85.80 OTHER SPECIFIED DISORDERS OF BONE DENSITY AND STRUCTURE, UNSPECIFIED SITE: ICD-10-CM

## 2023-05-02 DIAGNOSIS — Z00.00 ENCOUNTER FOR GENERAL ADULT MEDICAL EXAMINATION W/OUT ABNORMAL FINDINGS: ICD-10-CM

## 2023-05-02 PROCEDURE — 99214 OFFICE O/P EST MOD 30 MIN: CPT

## 2023-05-02 NOTE — HISTORY OF PRESENT ILLNESS
[FreeTextEntry1] : FU [de-identified] : ALFREDITO BRAGA is a 77 year F who comes in for a follow up visit.\par Pt with hx of left breast infiltrating carcinoma with ductal and lobular features in 2000 s/p left lumpectomy, axillary node dissection, s/p chemo, s/p radiation, s/p tamoxifen, right breast infiltrating ductal carcinoma dx 2003, s/p right lumpectomy sentinal node biopsy, s/p chemo, s/p radiation, right breast infiltrating ductal carcinoma dx in 202 s/p right mastectomy sentinel node biopsy and implant based reconstruction. \par She is due for refills and bw today. \par Patient denies any cp, sob,abdominal pain, nausea, vomiting, palpitations, fever, chills, constipation, diarrhea.\par

## 2023-05-02 NOTE — ASSESSMENT
[FreeTextEntry1] : 1.HM: Discussed blood work to obtain.\par Pt had left mammogram done with breast surgery.\par Pt had BMD with hematology/oncology this year 2023 which showed osteopenia per pt. \par does not wish for colonoscopy -has never had one and understands the risks. \par Patient counseled regarding recommendations for vaccines, diet and exercise and all preventative screening.\par \par 2.Hypothyroidism: recheck TFTs, continue on Synthroid 50 mcg once daily, refill today. \par \par 3.HLD: recheck lipids today and atorvastatin 10 mg today.  Patient advised on low cholesterol diet-decrease in white carbs and exercise 150 minutes per week.\par \par 4.Breast ca b/l: s/p surgery, recent mammogram reviewed, f/u with breast surgery and oncology, c/w letrozole. \par \par 5.Osteopenia: per pt on Fosamax in the past, obtain records of recent BMD from heme/onc .\par \par 6. High blood pressure: per pt due to white coat hypertension. \par Check blood pressure daily, if greater than 150/90, call MD. Keep 2 gm low sodium diet, exercise as tolerated.\par \par 7.prediabetes: recheck hba1c. Pt advised to keep diabetic diet, decrease carbs and increase dietary protein intake.\par Exercise as tolerated 3-4 times a week.\par \par

## 2023-05-09 LAB
25(OH)D3 SERPL-MCNC: 44 NG/ML
ALBUMIN SERPL ELPH-MCNC: 4.7 G/DL
ALP BLD-CCNC: 74 U/L
ALT SERPL-CCNC: 16 U/L
ANION GAP SERPL CALC-SCNC: 16 MMOL/L
AST SERPL-CCNC: 20 U/L
BASOPHILS # BLD AUTO: 0.05 K/UL
BASOPHILS NFR BLD AUTO: 0.6 %
BILIRUB SERPL-MCNC: 0.3 MG/DL
BUN SERPL-MCNC: 18 MG/DL
CALCIUM SERPL-MCNC: 10 MG/DL
CHLORIDE SERPL-SCNC: 103 MMOL/L
CHOLEST SERPL-MCNC: 197 MG/DL
CO2 SERPL-SCNC: 23 MMOL/L
CREAT SERPL-MCNC: 0.79 MG/DL
EGFR: 77 ML/MIN/1.73M2
EOSINOPHIL # BLD AUTO: 0.05 K/UL
EOSINOPHIL NFR BLD AUTO: 0.6 %
ESTIMATED AVERAGE GLUCOSE: 126 MG/DL
GLUCOSE SERPL-MCNC: 152 MG/DL
HBA1C MFR BLD HPLC: 6 %
HCT VFR BLD CALC: 47.2 %
HDLC SERPL-MCNC: 86 MG/DL
HGB BLD-MCNC: 15.4 G/DL
IMM GRANULOCYTES NFR BLD AUTO: 0.2 %
LDLC SERPL CALC-MCNC: 74 MG/DL
LYMPHOCYTES # BLD AUTO: 2.43 K/UL
LYMPHOCYTES NFR BLD AUTO: 29.3 %
MAN DIFF?: NORMAL
MCHC RBC-ENTMCNC: 32.2 PG
MCHC RBC-ENTMCNC: 32.6 GM/DL
MCV RBC AUTO: 98.5 FL
MONOCYTES # BLD AUTO: 0.62 K/UL
MONOCYTES NFR BLD AUTO: 7.5 %
NEUTROPHILS # BLD AUTO: 5.11 K/UL
NEUTROPHILS NFR BLD AUTO: 61.8 %
NONHDLC SERPL-MCNC: 111 MG/DL
PLATELET # BLD AUTO: 292 K/UL
POTASSIUM SERPL-SCNC: 4.2 MMOL/L
PROT SERPL-MCNC: 6.9 G/DL
RBC # BLD: 4.79 M/UL
RBC # FLD: 12.8 %
SODIUM SERPL-SCNC: 142 MMOL/L
T4 FREE SERPL-MCNC: 1.2 NG/DL
TRIGL SERPL-MCNC: 181 MG/DL
TSH SERPL-ACNC: 1.05 UIU/ML
WBC # FLD AUTO: 8.28 K/UL

## 2023-05-11 ENCOUNTER — NON-APPOINTMENT (OUTPATIENT)
Age: 78
End: 2023-05-11

## 2023-05-15 ENCOUNTER — APPOINTMENT (OUTPATIENT)
Dept: BREAST CENTER | Facility: CLINIC | Age: 78
End: 2023-05-15
Payer: MEDICARE

## 2023-05-15 VITALS
BODY MASS INDEX: 19.32 KG/M2 | WEIGHT: 105 LBS | HEIGHT: 62 IN | DIASTOLIC BLOOD PRESSURE: 87 MMHG | HEART RATE: 76 BPM | SYSTOLIC BLOOD PRESSURE: 186 MMHG

## 2023-05-15 DIAGNOSIS — N63.20 UNSPECIFIED LUMP IN THE LEFT BREAST, UNSPECIFIED QUADRANT: ICD-10-CM

## 2023-05-15 PROCEDURE — 99214 OFFICE O/P EST MOD 30 MIN: CPT

## 2023-05-15 NOTE — HISTORY OF PRESENT ILLNESS
[FreeTextEntry1] : Ms. Guillory is a 77 year old woman here for a followup for surveillance for breast cancer. 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 Washington) = 1.2 cm tumor, 0/12 LN\par - s/p chemotherapy (Dr. Jerry)\par - s/p radiation (Dr. Rivers)\par - s/p tamoxifen x 3 years\par \par 2.) R breast infiltrating ductal carcinoma diagnosed in 2003 at age 58, pathologic stage I, pT1 pN0, ER negative, [place] negative, Her2 0, [place] 7/9\par - s/p R lumpectomy, sentinel node biopsy (4/18/03, Dr. Washington) = 2.0 cm tumor, 0/1 R sln\par - s/p chemotherapy\par - s/p radiation\par \par 3.) R breast infiltrating ductal carcinoma, diagnosed in 2020 at age 73, pathologic stage II, pT2 pN0, ER 50%, [place] <1%, Her2 negative, [place] 6/9\par - s/p R mastectomy, sentinel node biopsy (10/7/20, Dr. Washington), and implant based reconstruction (Dr. Saul) = 2.5 cm tumor, 0/2 R sln\par \par She has no breast complaints. She gets nervous coming here and feel that driving over.\par \par She had declined genetic testing. Admission Reconciliation is Not Complete  Discharge Reconciliation is Not Complete Admission Reconciliation is Completed  Discharge Reconciliation is Completed

## 2023-05-15 NOTE — PHYSICAL EXAM
[Normocephalic] : normocephalic [Atraumatic] : atraumatic [Sclera nonicteric] : sclera nonicteric [Supple] : supple [No Supraclavicular Adenopathy] : no supraclavicular adenopathy [No Cervical Adenopathy] : no cervical adenopathy [Clear to Auscultation Bilat] : clear to auscultation bilaterally [Normal Sinus Rhythm] : normal sinus rhythm [No Rubs] : no pericardial rub [Examined in the supine and seated position] : examined in the supine and seated position [Asymmetrical] : asymmetrical [No dominant masses] : no dominant masses in right breast  [No dominant masses] : no dominant masses left breast [No Nipple Retraction] : no left nipple retraction [No Nipple Discharge] : no left nipple discharge [No Axillary Lymphadenopathy] : no left axillary lymphadenopathy [Soft] : abdomen soft [Not Tender] : non-tender [No Edema] : no edema [No Rashes] : no rashes [No Ulceration] : no ulceration [de-identified] : Vertical scar. Implant [de-identified] : Nodularity by lateral periareolar scar [de-identified] : Transverse scar [de-identified] : Trasnverse scar

## 2023-05-15 NOTE — DATA REVIEWED
[FreeTextEntry1] : SURGICAL PATHOLOGY: 10/7/20    RESULTS:  [place] node # 1 - negative for metastases.  NOnsentinel node # 1 - negative for metastases.   Right mastectomy - 2.5 cm Infiltrating ductal carcinoma, moderately differentiated.( 6-7/9).  Margins of resection are negative.    ER -50%, positive;  [place] - < 1%, negative.  HER2 - negative.  \par \par I have independently reviewed the reports and the images. \par \par L mammogram and US 12/28/22\par - extremely dense \par - BIRADS 2

## 2023-05-24 ENCOUNTER — NON-APPOINTMENT (OUTPATIENT)
Age: 78
End: 2023-05-24

## 2023-05-30 ENCOUNTER — APPOINTMENT (OUTPATIENT)
Dept: MAMMOGRAPHY | Facility: CLINIC | Age: 78
End: 2023-05-30
Payer: MEDICARE

## 2023-05-30 ENCOUNTER — RESULT REVIEW (OUTPATIENT)
Age: 78
End: 2023-05-30

## 2023-05-30 ENCOUNTER — OUTPATIENT (OUTPATIENT)
Dept: OUTPATIENT SERVICES | Facility: HOSPITAL | Age: 78
LOS: 1 days | End: 2023-05-30
Payer: MEDICARE

## 2023-05-30 ENCOUNTER — APPOINTMENT (OUTPATIENT)
Dept: ULTRASOUND IMAGING | Facility: CLINIC | Age: 78
End: 2023-05-30
Payer: MEDICARE

## 2023-05-30 DIAGNOSIS — Z98.42 CATARACT EXTRACTION STATUS, LEFT EYE: Chronic | ICD-10-CM

## 2023-05-30 DIAGNOSIS — Z90.13 ACQUIRED ABSENCE OF BILATERAL BREASTS AND NIPPLES: Chronic | ICD-10-CM

## 2023-05-30 DIAGNOSIS — Z98.890 OTHER SPECIFIED POSTPROCEDURAL STATES: Chronic | ICD-10-CM

## 2023-05-30 DIAGNOSIS — N63.20 UNSPECIFIED LUMP IN THE LEFT BREAST, UNSPECIFIED QUADRANT: ICD-10-CM

## 2023-05-30 PROCEDURE — 76642 ULTRASOUND BREAST LIMITED: CPT | Mod: 26,LT

## 2023-05-30 PROCEDURE — 76642 ULTRASOUND BREAST LIMITED: CPT

## 2023-10-27 ENCOUNTER — RX RENEWAL (OUTPATIENT)
Age: 78
End: 2023-10-27

## 2023-11-02 ENCOUNTER — NON-APPOINTMENT (OUTPATIENT)
Age: 78
End: 2023-11-02

## 2023-11-02 ENCOUNTER — APPOINTMENT (OUTPATIENT)
Dept: INTERNAL MEDICINE | Facility: CLINIC | Age: 78
End: 2023-11-02
Payer: MEDICARE

## 2023-11-02 VITALS
BODY MASS INDEX: 19.17 KG/M2 | DIASTOLIC BLOOD PRESSURE: 78 MMHG | HEART RATE: 102 BPM | WEIGHT: 104.2 LBS | OXYGEN SATURATION: 97 % | HEIGHT: 62 IN | SYSTOLIC BLOOD PRESSURE: 158 MMHG | TEMPERATURE: 99 F

## 2023-11-02 DIAGNOSIS — F41.9 ANXIETY DISORDER, UNSPECIFIED: ICD-10-CM

## 2023-11-02 DIAGNOSIS — E55.9 VITAMIN D DEFICIENCY, UNSPECIFIED: ICD-10-CM

## 2023-11-02 PROCEDURE — 99214 OFFICE O/P EST MOD 30 MIN: CPT

## 2023-11-03 ENCOUNTER — NON-APPOINTMENT (OUTPATIENT)
Age: 78
End: 2023-11-03

## 2023-11-03 LAB
25(OH)D3 SERPL-MCNC: 41.5 NG/ML
ALBUMIN SERPL ELPH-MCNC: 4.7 G/DL
ALP BLD-CCNC: 71 U/L
ALT SERPL-CCNC: 12 U/L
ANION GAP SERPL CALC-SCNC: 13 MMOL/L
AST SERPL-CCNC: 20 U/L
BASOPHILS # BLD AUTO: 0.03 K/UL
BASOPHILS NFR BLD AUTO: 0.4 %
BILIRUB SERPL-MCNC: 0.3 MG/DL
BUN SERPL-MCNC: 19 MG/DL
CALCIUM SERPL-MCNC: 9.8 MG/DL
CHLORIDE SERPL-SCNC: 103 MMOL/L
CHOLEST SERPL-MCNC: 189 MG/DL
CO2 SERPL-SCNC: 24 MMOL/L
CREAT SERPL-MCNC: 0.81 MG/DL
EGFR: 74 ML/MIN/1.73M2
EOSINOPHIL # BLD AUTO: 0.06 K/UL
EOSINOPHIL NFR BLD AUTO: 0.8 %
ESTIMATED AVERAGE GLUCOSE: 126 MG/DL
GLUCOSE SERPL-MCNC: 144 MG/DL
HBA1C MFR BLD HPLC: 6 %
HCT VFR BLD CALC: 45 %
HDLC SERPL-MCNC: 76 MG/DL
HGB BLD-MCNC: 15 G/DL
IMM GRANULOCYTES NFR BLD AUTO: 0.3 %
LDLC SERPL CALC-MCNC: 82 MG/DL
LYMPHOCYTES # BLD AUTO: 1.99 K/UL
LYMPHOCYTES NFR BLD AUTO: 27.7 %
MAN DIFF?: NORMAL
MCHC RBC-ENTMCNC: 32.2 PG
MCHC RBC-ENTMCNC: 33.3 GM/DL
MCV RBC AUTO: 96.6 FL
MONOCYTES # BLD AUTO: 0.54 K/UL
MONOCYTES NFR BLD AUTO: 7.5 %
NEUTROPHILS # BLD AUTO: 4.54 K/UL
NEUTROPHILS NFR BLD AUTO: 63.3 %
NONHDLC SERPL-MCNC: 114 MG/DL
PLATELET # BLD AUTO: 276 K/UL
POTASSIUM SERPL-SCNC: 4.1 MMOL/L
PROT SERPL-MCNC: 6.9 G/DL
RBC # BLD: 4.66 M/UL
RBC # FLD: 12.4 %
SODIUM SERPL-SCNC: 140 MMOL/L
T4 FREE SERPL-MCNC: 1.1 NG/DL
TRIGL SERPL-MCNC: 194 MG/DL
TSH SERPL-ACNC: 1.29 UIU/ML
WBC # FLD AUTO: 7.18 K/UL

## 2023-12-21 ENCOUNTER — APPOINTMENT (OUTPATIENT)
Dept: BREAST CENTER | Facility: CLINIC | Age: 78
End: 2023-12-21
Payer: MEDICARE

## 2023-12-21 VITALS
HEART RATE: 85 BPM | DIASTOLIC BLOOD PRESSURE: 93 MMHG | BODY MASS INDEX: 18.95 KG/M2 | WEIGHT: 103 LBS | HEIGHT: 62 IN | SYSTOLIC BLOOD PRESSURE: 208 MMHG

## 2023-12-21 DIAGNOSIS — R21 RASH AND OTHER NONSPECIFIC SKIN ERUPTION: ICD-10-CM

## 2023-12-21 DIAGNOSIS — Z85.3 PERSONAL HISTORY OF MALIGNANT NEOPLASM OF BREAST: ICD-10-CM

## 2023-12-21 PROCEDURE — 99214 OFFICE O/P EST MOD 30 MIN: CPT

## 2023-12-21 NOTE — CONSULT LETTER
[Dear  ___] : Dear  [unfilled], [Courtesy Letter:] : I had the pleasure of seeing your patient, [unfilled], in my office today. [Please see my note below.] : Please see my note below. [Consult Closing:] : Thank you very much for allowing me to participate in the care of this patient.  If you have any questions, please do not hesitate to contact me. [Sincerely,] : Sincerely, [FreeTextEntry3] : Codie Leon MD FACS

## 2023-12-21 NOTE — DATA REVIEWED
[FreeTextEntry1] : SURGICAL PATHOLOGY: 10/7/20    RESULTS:  [place] node # 1 - negative for metastases.  NOnsentinel node # 1 - negative for metastases.   Right mastectomy - 2.5 cm Infiltrating ductal carcinoma, moderately differentiated.( 6-7/9).  Margins of resection are negative.    ER -50%, positive;  [place] - < 1%, negative.  HER2 - negative.    I have independently reviewed the reports and the images.   L mammogram and US 12/28/22 - extremely dense  - BIRADS 2  L US 5/30/23 - scar area without change at area of concern in L breast 3:00 periareolar - BIRADS 2

## 2023-12-21 NOTE — PHYSICAL EXAM
[Normocephalic] : normocephalic [Atraumatic] : atraumatic [Sclera nonicteric] : sclera nonicteric [Supple] : supple [No Supraclavicular Adenopathy] : no supraclavicular adenopathy [No Cervical Adenopathy] : no cervical adenopathy [Clear to Auscultation Bilat] : clear to auscultation bilaterally [Normal Sinus Rhythm] : normal sinus rhythm [No Rubs] : no pericardial rub [Examined in the supine and seated position] : examined in the supine and seated position [Asymmetrical] : asymmetrical [No dominant masses] : no dominant masses in right breast  [No dominant masses] : no dominant masses left breast [No Nipple Retraction] : no left nipple retraction [No Nipple Discharge] : no left nipple discharge [No Axillary Lymphadenopathy] : no left axillary lymphadenopathy [No Edema] : no edema [No Rashes] : no rashes [No Ulceration] : no ulceration [de-identified] : Vertical scar. Implant [de-identified] : Mild volume loss and nodular scar tissue just lateral to the lateral periareolar scar. Small punctate erythematous rash wtihout any edema.  [de-identified] : Transverse scar [de-identified] : Trasnverse scar

## 2023-12-21 NOTE — HISTORY OF PRESENT ILLNESS
[FreeTextEntry1] : Ms. Guillory is a 78 year old woman here for a follow-up for surveillance for breast cancer. 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 Washington) = 1.2 cm tumor, 0/12 LN - s/p chemotherapy (Dr. Jerry) - s/p radiation (Dr. Rivers) - s/p tamoxifen x 3 years  2.) R breast infiltrating ductal carcinoma diagnosed in 2003 at age 58, pathologic stage I, pT1 pN0, ER negative, [place] negative, Her2 0, [place] 7/9 - s/p R lumpectomy, sentinel node biopsy (4/18/03, Dr. Washington) = 2.0 cm tumor, 0/1 R sln - s/p chemotherapy - s/p radiation  3.) R breast infiltrating ductal carcinoma, diagnosed in 2020 at age 73, pathologic stage II, pT2 pN0, ER 50%, [place] <1%, Her2 negative, [place] 6/9 - s/p R mastectomy, sentinel node biopsy (10/7/20, Dr. Washington), and implant based reconstruction (Dr. Saul) = 2.5 cm tumor, 0/2 R sln  Last week she noticed a rash on the left upper outer breast. It is not tender. It was a bit itchy. No drainage. No breast lumps. She read about inflammatory breast cancer in a journal and was concerned that this was it. She is more anxious than usual at the doctor's office today because of this.  She had declined genetic testing.

## 2024-03-28 ENCOUNTER — OUTPATIENT (OUTPATIENT)
Dept: OUTPATIENT SERVICES | Facility: HOSPITAL | Age: 79
LOS: 1 days | End: 2024-03-28
Payer: MEDICARE

## 2024-03-28 ENCOUNTER — RESULT REVIEW (OUTPATIENT)
Age: 79
End: 2024-03-28

## 2024-03-28 ENCOUNTER — APPOINTMENT (OUTPATIENT)
Dept: MAMMOGRAPHY | Facility: CLINIC | Age: 79
End: 2024-03-28
Payer: MEDICARE

## 2024-03-28 ENCOUNTER — APPOINTMENT (OUTPATIENT)
Dept: ULTRASOUND IMAGING | Facility: CLINIC | Age: 79
End: 2024-03-28
Payer: MEDICARE

## 2024-03-28 DIAGNOSIS — Z98.890 OTHER SPECIFIED POSTPROCEDURAL STATES: Chronic | ICD-10-CM

## 2024-03-28 DIAGNOSIS — Z90.13 ACQUIRED ABSENCE OF BILATERAL BREASTS AND NIPPLES: Chronic | ICD-10-CM

## 2024-03-28 DIAGNOSIS — R92.30 DENSE BREASTS, UNSPECIFIED: ICD-10-CM

## 2024-03-28 DIAGNOSIS — Z98.42 CATARACT EXTRACTION STATUS, LEFT EYE: Chronic | ICD-10-CM

## 2024-03-28 PROCEDURE — 76641 ULTRASOUND BREAST COMPLETE: CPT | Mod: 26,LT,3G

## 2024-03-28 PROCEDURE — 77065 DX MAMMO INCL CAD UNI: CPT | Mod: 26,LT

## 2024-03-28 PROCEDURE — 77065 DX MAMMO INCL CAD UNI: CPT

## 2024-03-28 PROCEDURE — G0279: CPT | Mod: 26

## 2024-03-28 PROCEDURE — 76641 ULTRASOUND BREAST COMPLETE: CPT

## 2024-03-28 PROCEDURE — G0279: CPT

## 2024-04-05 ENCOUNTER — NON-APPOINTMENT (OUTPATIENT)
Age: 79
End: 2024-04-05

## 2024-04-23 RX ORDER — LEVOTHYROXINE SODIUM 50 UG/1
50 TABLET ORAL
Qty: 90 | Refills: 0 | Status: ACTIVE | COMMUNITY
Start: 1900-01-01 | End: 1900-01-01

## 2024-05-02 ENCOUNTER — APPOINTMENT (OUTPATIENT)
Dept: INTERNAL MEDICINE | Facility: CLINIC | Age: 79
End: 2024-05-02
Payer: MEDICARE

## 2024-05-02 VITALS
OXYGEN SATURATION: 97 % | HEART RATE: 71 BPM | TEMPERATURE: 98.1 F | DIASTOLIC BLOOD PRESSURE: 66 MMHG | SYSTOLIC BLOOD PRESSURE: 174 MMHG | WEIGHT: 104 LBS | BODY MASS INDEX: 19.14 KG/M2 | HEIGHT: 62 IN

## 2024-05-02 VITALS — DIASTOLIC BLOOD PRESSURE: 78 MMHG | SYSTOLIC BLOOD PRESSURE: 158 MMHG

## 2024-05-02 DIAGNOSIS — C50.911 MALIGNANT NEOPLASM OF UNSPECIFIED SITE OF RIGHT FEMALE BREAST: ICD-10-CM

## 2024-05-02 DIAGNOSIS — E78.5 HYPERLIPIDEMIA, UNSPECIFIED: ICD-10-CM

## 2024-05-02 DIAGNOSIS — E03.9 HYPOTHYROIDISM, UNSPECIFIED: ICD-10-CM

## 2024-05-02 DIAGNOSIS — R73.03 PREDIABETES.: ICD-10-CM

## 2024-05-02 PROCEDURE — 99214 OFFICE O/P EST MOD 30 MIN: CPT

## 2024-05-02 PROCEDURE — G2211 COMPLEX E/M VISIT ADD ON: CPT

## 2024-05-02 NOTE — HISTORY OF PRESENT ILLNESS
[FreeTextEntry1] :  Follow Up Visit [de-identified] : ALFREDITO BRAGA is a 78 year F who comes in for a follow up visit. Pt with hx of left breast infiltrating carcinoma with ductal and lobular features in 2000 s/p left lumpectomy, axillary node dissection, s/p chemo, s/p radiation, s/p tamoxifen, right breast infiltrating ductal carcinoma dx 2003, s/p right lumpectomy sentinel node biopsy, s/p chemo, s/p radiation, right breast infiltrating ductal carcinoma dx in 202 s/p right mastectomy sentinel node biopsy and implant-based reconstruction.  She is due for blood work, did not have repeat lipids done since increasing dose of Lipitor.  She had rash of left breast and saw breast sx in 12/23 and advised not related to inflammatory breast cancer. Had stable mammogram and US breast 3/2024.  Patient denies any cp, sob, abdominal pain, nausea, vomiting, palpitations, fever, chills, constipation, diarrhea.

## 2024-05-02 NOTE — ASSESSMENT
[FreeTextEntry1] : 1.Hypothyroidism: recheck TFTs, continue on Synthroid 50 mcg once daily, refill today.  2.HLD: recheck on fasting lipids today as she is now on atorvastatin 20 mg daily for the past 6 mo. Patient advised on low cholesterol diet-decrease in white carbs and exercise 150 minutes per week.  3.Breast ca b/l: s/p surgery, f/u with breast sx with mammogram, f/u with breast surgery and oncology, c/w letrozole.  4. High blood pressure: per pt due to white coat hypertension. advised to check blood pressure at home.  Check blood pressure daily, if greater than 150/90, call MD. Keep 2 gm low sodium diet, exercise as tolerated.  5.prediabetes: recheck hba1c. Pt advised to keep diabetic diet, decrease carbs and increase dietary protein intake. Exercise as tolerated 3-4 times a week.

## 2024-06-17 RX ORDER — ATORVASTATIN CALCIUM 20 MG/1
20 TABLET, FILM COATED ORAL
Qty: 30 | Refills: 0 | Status: ACTIVE | COMMUNITY
Start: 1900-01-01 | End: 1900-01-01

## 2024-06-25 ENCOUNTER — APPOINTMENT (OUTPATIENT)
Dept: BREAST CENTER | Facility: CLINIC | Age: 79
End: 2024-06-25
Payer: MEDICARE

## 2024-06-25 VITALS
BODY MASS INDEX: 19.14 KG/M2 | WEIGHT: 104 LBS | DIASTOLIC BLOOD PRESSURE: 76 MMHG | HEART RATE: 79 BPM | SYSTOLIC BLOOD PRESSURE: 162 MMHG | HEIGHT: 62 IN

## 2024-06-25 DIAGNOSIS — Z08 ENCOUNTER FOR FOLLOW-UP EXAMINATION AFTER COMPLETED TREATMENT FOR MALIGNANT NEOPLASM: ICD-10-CM

## 2024-06-25 DIAGNOSIS — Z85.3 ENCOUNTER FOR FOLLOW-UP EXAMINATION AFTER COMPLETED TREATMENT FOR MALIGNANT NEOPLASM: ICD-10-CM

## 2024-06-25 DIAGNOSIS — R92.30 DENSE BREASTS, UNSPECIFIED: ICD-10-CM

## 2024-06-25 DIAGNOSIS — Z12.39 ENCOUNTER FOR OTHER SCREENING FOR MALIGNANT NEOPLASM OF BREAST: ICD-10-CM

## 2024-06-25 PROCEDURE — 99214 OFFICE O/P EST MOD 30 MIN: CPT

## 2024-07-03 ENCOUNTER — NON-APPOINTMENT (OUTPATIENT)
Age: 79
End: 2024-07-03

## 2024-11-02 ENCOUNTER — LABORATORY RESULT (OUTPATIENT)
Age: 79
End: 2024-11-02

## 2024-11-06 ENCOUNTER — APPOINTMENT (OUTPATIENT)
Dept: INTERNAL MEDICINE | Facility: CLINIC | Age: 79
End: 2024-11-06
Payer: MEDICARE

## 2024-11-06 VITALS
HEART RATE: 83 BPM | BODY MASS INDEX: 18.95 KG/M2 | SYSTOLIC BLOOD PRESSURE: 170 MMHG | DIASTOLIC BLOOD PRESSURE: 82 MMHG | HEIGHT: 62 IN | TEMPERATURE: 98.7 F | WEIGHT: 103 LBS | OXYGEN SATURATION: 97 %

## 2024-11-06 DIAGNOSIS — C50.911 MALIGNANT NEOPLASM OF UNSPECIFIED SITE OF RIGHT FEMALE BREAST: ICD-10-CM

## 2024-11-06 DIAGNOSIS — E78.5 HYPERLIPIDEMIA, UNSPECIFIED: ICD-10-CM

## 2024-11-06 DIAGNOSIS — E03.9 HYPOTHYROIDISM, UNSPECIFIED: ICD-10-CM

## 2024-11-06 DIAGNOSIS — R73.03 PREDIABETES.: ICD-10-CM

## 2024-11-06 DIAGNOSIS — I10 ESSENTIAL (PRIMARY) HYPERTENSION: ICD-10-CM

## 2024-11-06 PROCEDURE — G2211 COMPLEX E/M VISIT ADD ON: CPT

## 2024-11-06 PROCEDURE — 99214 OFFICE O/P EST MOD 30 MIN: CPT

## 2024-12-16 ENCOUNTER — APPOINTMENT (OUTPATIENT)
Dept: BREAST CENTER | Facility: CLINIC | Age: 79
End: 2024-12-16
Payer: MEDICARE

## 2024-12-16 VITALS
WEIGHT: 105 LBS | HEIGHT: 62 IN | BODY MASS INDEX: 19.32 KG/M2 | DIASTOLIC BLOOD PRESSURE: 76 MMHG | SYSTOLIC BLOOD PRESSURE: 175 MMHG | HEART RATE: 76 BPM

## 2024-12-16 DIAGNOSIS — Z08 ENCOUNTER FOR FOLLOW-UP EXAMINATION AFTER COMPLETED TREATMENT FOR MALIGNANT NEOPLASM: ICD-10-CM

## 2024-12-16 DIAGNOSIS — Z85.3 ENCOUNTER FOR FOLLOW-UP EXAMINATION AFTER COMPLETED TREATMENT FOR MALIGNANT NEOPLASM: ICD-10-CM

## 2024-12-16 DIAGNOSIS — Z12.39 ENCOUNTER FOR OTHER SCREENING FOR MALIGNANT NEOPLASM OF BREAST: ICD-10-CM

## 2024-12-16 DIAGNOSIS — R92.30 DENSE BREASTS, UNSPECIFIED: ICD-10-CM

## 2024-12-16 PROCEDURE — 99214 OFFICE O/P EST MOD 30 MIN: CPT

## 2025-03-06 ENCOUNTER — APPOINTMENT (OUTPATIENT)
Dept: ULTRASOUND IMAGING | Facility: CLINIC | Age: 80
End: 2025-03-06
Payer: MEDICARE

## 2025-03-06 ENCOUNTER — RESULT REVIEW (OUTPATIENT)
Age: 80
End: 2025-03-06

## 2025-03-06 ENCOUNTER — APPOINTMENT (OUTPATIENT)
Dept: MAMMOGRAPHY | Facility: CLINIC | Age: 80
End: 2025-03-06
Payer: MEDICARE

## 2025-03-06 ENCOUNTER — OUTPATIENT (OUTPATIENT)
Dept: OUTPATIENT SERVICES | Facility: HOSPITAL | Age: 80
LOS: 1 days | End: 2025-03-06
Payer: MEDICARE

## 2025-03-06 DIAGNOSIS — Z98.890 OTHER SPECIFIED POSTPROCEDURAL STATES: Chronic | ICD-10-CM

## 2025-03-06 DIAGNOSIS — Z98.42 CATARACT EXTRACTION STATUS, LEFT EYE: Chronic | ICD-10-CM

## 2025-03-06 DIAGNOSIS — Z85.3 PERSONAL HISTORY OF MALIGNANT NEOPLASM OF BREAST: ICD-10-CM

## 2025-03-06 DIAGNOSIS — Z12.39 ENCOUNTER FOR OTHER SCREENING FOR MALIGNANT NEOPLASM OF BREAST: ICD-10-CM

## 2025-03-06 DIAGNOSIS — R92.30 DENSE BREASTS, UNSPECIFIED: ICD-10-CM

## 2025-03-06 DIAGNOSIS — Z90.13 ACQUIRED ABSENCE OF BILATERAL BREASTS AND NIPPLES: Chronic | ICD-10-CM

## 2025-03-06 PROCEDURE — 77065 DX MAMMO INCL CAD UNI: CPT

## 2025-03-06 PROCEDURE — 76641 ULTRASOUND BREAST COMPLETE: CPT

## 2025-03-06 PROCEDURE — 76641 ULTRASOUND BREAST COMPLETE: CPT | Mod: 26,LT,GA

## 2025-03-06 PROCEDURE — 77065 DX MAMMO INCL CAD UNI: CPT | Mod: 26,LT

## 2025-03-06 PROCEDURE — G0279: CPT | Mod: 26

## 2025-03-06 PROCEDURE — G0279: CPT

## 2025-06-10 ENCOUNTER — APPOINTMENT (OUTPATIENT)
Dept: INTERNAL MEDICINE | Facility: CLINIC | Age: 80
End: 2025-06-10
Payer: MEDICARE

## 2025-06-10 VITALS
OXYGEN SATURATION: 97 % | HEART RATE: 102 BPM | HEIGHT: 62 IN | SYSTOLIC BLOOD PRESSURE: 158 MMHG | TEMPERATURE: 97.9 F | BODY MASS INDEX: 18.95 KG/M2 | WEIGHT: 103 LBS | DIASTOLIC BLOOD PRESSURE: 78 MMHG

## 2025-06-10 PROCEDURE — G2211 COMPLEX E/M VISIT ADD ON: CPT

## 2025-06-10 PROCEDURE — 99214 OFFICE O/P EST MOD 30 MIN: CPT
